# Patient Record
Sex: FEMALE | Race: WHITE | NOT HISPANIC OR LATINO | Employment: OTHER | URBAN - METROPOLITAN AREA
[De-identification: names, ages, dates, MRNs, and addresses within clinical notes are randomized per-mention and may not be internally consistent; named-entity substitution may affect disease eponyms.]

---

## 2022-04-07 ENCOUNTER — HISTORICAL (OUTPATIENT)
Dept: ADMINISTRATIVE | Facility: HOSPITAL | Age: 82
End: 2022-04-07

## 2022-04-24 VITALS
SYSTOLIC BLOOD PRESSURE: 151 MMHG | HEIGHT: 64 IN | BODY MASS INDEX: 20.32 KG/M2 | WEIGHT: 119 LBS | DIASTOLIC BLOOD PRESSURE: 84 MMHG

## 2023-03-03 ENCOUNTER — OFFICE VISIT (OUTPATIENT)
Dept: FAMILY MEDICINE | Facility: CLINIC | Age: 83
End: 2023-03-03
Payer: MEDICARE

## 2023-03-03 VITALS
SYSTOLIC BLOOD PRESSURE: 134 MMHG | DIASTOLIC BLOOD PRESSURE: 79 MMHG | WEIGHT: 122 LBS | HEART RATE: 61 BPM | HEIGHT: 64 IN | BODY MASS INDEX: 20.83 KG/M2 | TEMPERATURE: 97 F | RESPIRATION RATE: 20 BRPM

## 2023-03-03 DIAGNOSIS — K14.8 TONGUE LESION: ICD-10-CM

## 2023-03-03 DIAGNOSIS — J02.9 SORE THROAT: ICD-10-CM

## 2023-03-03 PROCEDURE — 99202 PR OFFICE/OUTPT VISIT, NEW, LEVL II, 15-29 MIN: ICD-10-PCS | Mod: ,,, | Performed by: NURSE PRACTITIONER

## 2023-03-03 PROCEDURE — 99202 OFFICE O/P NEW SF 15 MIN: CPT | Mod: ,,, | Performed by: NURSE PRACTITIONER

## 2023-03-03 NOTE — PROGRESS NOTES
"Subjective:       Patient ID: Karo Lakhani is a 82 y.o. female.    Chief Complaint: Sore Throat (Sore throat, bumps on tongue X's 4 days)     The patient is a 82-year-old female who states she does not go to health care providers because she is never sick.  She presents today stating she has bumps on her tongue that she is concerned about.  She just noticed these bumps about 4 days ago.  Patient also states she has a sore throat.  The patient tells me she is seeing a dentist next week due to the bumps on her tongue.    Sore Throat   This is a new problem. The current episode started in the past 7 days. The problem has been unchanged. Neither side of throat is experiencing more pain than the other. There has been no fever. The fever has been present for 3 to 4 days. The pain is at a severity of 5/10. The pain is moderate. She has tried gargles (warm salt water) for the symptoms. The treatment provided mild relief.     Review of Systems   HENT:  Positive for sore throat.   12 point review of systems conducted, negative except as stated in the history of present illness. See HPI for details.        Objective:        Visit Vitals  /79   Pulse 61   Temp 97.2 °F (36.2 °C) (Temporal)   Resp 20   Ht 5' 3.74" (1.619 m)   Wt 55.3 kg (122 lb)   BMI 21.11 kg/m²        Physical Exam  Vitals and nursing note reviewed.   HENT:      Head: Normocephalic.      Nose: Nose normal.      Mouth/Throat:      Mouth: Mucous membranes are moist.      Comments: Symmetrical bumps on each side of her tongue.      Throat appears raw  Eyes:      Extraocular Movements: Extraocular movements intact.   Cardiovascular:      Rate and Rhythm: Normal rate and regular rhythm.      Heart sounds: No murmur heard.  Pulmonary:      Effort: Pulmonary effort is normal.      Breath sounds: Normal breath sounds.   Abdominal:      Palpations: Abdomen is soft.   Musculoskeletal:         General: Normal range of motion.      Cervical back: Normal range of " motion and neck supple.   Skin:     General: Skin is warm and dry.   Neurological:      Mental Status: She is alert and oriented to person, place, and time.         Assessment:           ICD-10-CM ICD-9-CM   1. Tongue lesion  K14.8 529.8   2. Sore throat  J02.9 462            Plan:         1. Tongue lesion   The patient is seeing a dentist next week.    Keep the dental appointment    2. Sore throat   Gargle with magic mouthwash and spit ( Benadryl, Maalox, and lidocaine viscous )   Call if patient develops fever     Call the office with any questions or concerns          No follow-ups on file.     No future appointments.     History reviewed. No pertinent past medical history.     Review of patient's allergies indicates:  No Known Allergies     No current outpatient medications       Patient Active Problem List   Diagnosis    Tongue lesion    Sore throat           History reviewed. No pertinent past medical history.       History reviewed. No pertinent surgical history.        reports that she has never smoked. She has never used smokeless tobacco.      There is no immunization history on file for this patient.     Health Maintenance   Topic Date Due    Lipid Panel  Never done    TETANUS VACCINE  Never done    DEXA Scan  Never done

## 2023-10-16 ENCOUNTER — OFFICE VISIT (OUTPATIENT)
Dept: FAMILY MEDICINE | Facility: CLINIC | Age: 83
End: 2023-10-16
Payer: MEDICARE

## 2023-10-16 VITALS
WEIGHT: 117 LBS | HEART RATE: 78 BPM | DIASTOLIC BLOOD PRESSURE: 61 MMHG | RESPIRATION RATE: 20 BRPM | TEMPERATURE: 97 F | HEIGHT: 64 IN | BODY MASS INDEX: 19.97 KG/M2 | SYSTOLIC BLOOD PRESSURE: 97 MMHG

## 2023-10-16 DIAGNOSIS — N63.22 MASS OF UPPER INNER QUADRANT OF LEFT BREAST: ICD-10-CM

## 2023-10-16 PROCEDURE — 99213 OFFICE O/P EST LOW 20 MIN: CPT | Mod: ,,, | Performed by: NURSE PRACTITIONER

## 2023-10-16 PROCEDURE — 99213 PR OFFICE/OUTPT VISIT, EST, LEVL III, 20-29 MIN: ICD-10-PCS | Mod: ,,, | Performed by: NURSE PRACTITIONER

## 2023-10-16 NOTE — PROGRESS NOTES
"Subjective:       Patient ID: Karo Lakhani is a 82 y.o. female.    Chief Complaint: Breast Mass (Lump in left breast X's 3 days)      The patient is an 82-year-old female who states she found a lump in her breast over the weekend.  She would like to have a mammogram      Review of Systems   Integumentary:  Positive for breast mass. Negative for color change, breast discharge and breast tenderness.   Breast: Positive for mass.Negative for tenderness  12 point review of systems conducted, negative except as stated in the history of present illness. See HPI for details.      Objective:      Visit Vitals  BP 97/61   Pulse 78   Temp 97.2 °F (36.2 °C) (Temporal)   Resp 20   Ht 5' 3.74" (1.619 m)   Wt 53.1 kg (117 lb)   BMI 20.25 kg/m²     Physical Exam  Vitals and nursing note reviewed.   HENT:      Head: Normocephalic.      Right Ear: Tympanic membrane normal.      Left Ear: Tympanic membrane normal.      Nose: Nose normal.      Mouth/Throat:      Mouth: Mucous membranes are moist.   Eyes:      Extraocular Movements: Extraocular movements intact.   Cardiovascular:      Rate and Rhythm: Normal rate and regular rhythm.      Heart sounds: No murmur heard.  Pulmonary:      Effort: Pulmonary effort is normal.      Breath sounds: Normal breath sounds.   Abdominal:      General: Bowel sounds are normal.      Palpations: Abdomen is soft.   Musculoskeletal:         General: Normal range of motion.      Cervical back: Normal range of motion and neck supple.   Skin:     General: Skin is warm and dry.      Comments: Left breast 12:00 p.m. near the nipple is a hard mass that is nontender to palpation   Neurological:      Mental Status: She is alert and oriented to person, place, and time.       No current outpatient medications  has No Known Allergies.       Assessment:         ICD-10-CM ICD-9-CM   1. Mass of upper inner quadrant of left breast  N63.22 611.72          Plan:   1. Mass of upper inner quadrant of left breast  - Mammo " Digital Diagnostic Left with Shaheen; Future  - US Breast Left Limited; Future        Follow up in about 2 weeks (around 10/30/2023), or if symptoms worsen or fail to improve.     No future appointments.

## 2023-11-15 ENCOUNTER — HOSPITAL ENCOUNTER (OUTPATIENT)
Dept: RADIOLOGY | Facility: HOSPITAL | Age: 83
Discharge: HOME | End: 2023-11-15
Attending: NURSE PRACTITIONER
Payer: MEDICARE

## 2023-11-15 DIAGNOSIS — N63.22 MASS OF UPPER INNER QUADRANT OF LEFT BREAST: ICD-10-CM

## 2023-11-15 PROCEDURE — 77062 BREAST TOMOSYNTHESIS BI: CPT | Mod: 26,,, | Performed by: STUDENT IN AN ORGANIZED HEALTH CARE EDUCATION/TRAINING PROGRAM

## 2023-11-15 PROCEDURE — 77062 MAMMO DIGITAL DIAGNOSTIC BILAT WITH TOMO: ICD-10-PCS | Mod: 26,,, | Performed by: STUDENT IN AN ORGANIZED HEALTH CARE EDUCATION/TRAINING PROGRAM

## 2023-11-15 PROCEDURE — 77066 DX MAMMO INCL CAD BI: CPT | Mod: TC

## 2023-11-15 PROCEDURE — 76642 ULTRASOUND BREAST LIMITED: CPT | Mod: TC,LT

## 2023-11-15 PROCEDURE — 77066 DX MAMMO INCL CAD BI: CPT | Mod: 26,,, | Performed by: STUDENT IN AN ORGANIZED HEALTH CARE EDUCATION/TRAINING PROGRAM

## 2023-11-15 PROCEDURE — 76642 ULTRASOUND BREAST LIMITED: CPT | Mod: 26,LT,, | Performed by: STUDENT IN AN ORGANIZED HEALTH CARE EDUCATION/TRAINING PROGRAM

## 2023-11-15 PROCEDURE — 77066 MAMMO DIGITAL DIAGNOSTIC BILAT WITH TOMO: ICD-10-PCS | Mod: 26,,, | Performed by: STUDENT IN AN ORGANIZED HEALTH CARE EDUCATION/TRAINING PROGRAM

## 2023-11-15 PROCEDURE — 76642 US BREAST LEFT LIMITED: ICD-10-PCS | Mod: 26,LT,, | Performed by: STUDENT IN AN ORGANIZED HEALTH CARE EDUCATION/TRAINING PROGRAM

## 2023-12-06 ENCOUNTER — HOSPITAL ENCOUNTER (OUTPATIENT)
Dept: RADIOLOGY | Facility: HOSPITAL | Age: 83
Discharge: HOME | End: 2023-12-06
Attending: NURSE PRACTITIONER
Payer: MEDICARE

## 2023-12-06 DIAGNOSIS — R92.8 ABNORMAL MAMMOGRAM: ICD-10-CM

## 2023-12-06 DIAGNOSIS — R92.8 ABNORMAL MAMMOGRAM: Primary | ICD-10-CM

## 2023-12-06 PROCEDURE — 77061 MAMMO DIGITAL DIAGNOSTIC LEFT WITH TOMO: ICD-10-PCS | Mod: 26,LT,, | Performed by: STUDENT IN AN ORGANIZED HEALTH CARE EDUCATION/TRAINING PROGRAM

## 2023-12-06 PROCEDURE — 19083 BX BREAST 1ST LESION US IMAG: CPT | Mod: LT,,, | Performed by: STUDENT IN AN ORGANIZED HEALTH CARE EDUCATION/TRAINING PROGRAM

## 2023-12-06 PROCEDURE — 19083 US BREAST BIOPSY WITH IMAGING 1ST SITE LEFT: ICD-10-PCS | Mod: LT,,, | Performed by: STUDENT IN AN ORGANIZED HEALTH CARE EDUCATION/TRAINING PROGRAM

## 2023-12-06 PROCEDURE — 88305 TISSUE EXAM BY PATHOLOGIST: CPT | Performed by: NURSE PRACTITIONER

## 2023-12-06 PROCEDURE — 19083 BX BREAST 1ST LESION US IMAG: CPT | Mod: LT

## 2023-12-06 PROCEDURE — 77065 DX MAMMO INCL CAD UNI: CPT | Mod: 26,LT,, | Performed by: STUDENT IN AN ORGANIZED HEALTH CARE EDUCATION/TRAINING PROGRAM

## 2023-12-06 PROCEDURE — 77065 DX MAMMO INCL CAD UNI: CPT | Mod: TC,LT

## 2023-12-06 PROCEDURE — 88361 TUMOR IMMUNOHISTOCHEM/COMPUT: CPT

## 2023-12-06 PROCEDURE — 77061 BREAST TOMOSYNTHESIS UNI: CPT | Mod: 26,LT,, | Performed by: STUDENT IN AN ORGANIZED HEALTH CARE EDUCATION/TRAINING PROGRAM

## 2023-12-06 PROCEDURE — 77065 MAMMO DIGITAL DIAGNOSTIC LEFT WITH TOMO: ICD-10-PCS | Mod: 26,LT,, | Performed by: STUDENT IN AN ORGANIZED HEALTH CARE EDUCATION/TRAINING PROGRAM

## 2023-12-06 RX ORDER — BUPIVACAINE HYDROCHLORIDE 2.5 MG/ML
10 INJECTION, SOLUTION EPIDURAL; INFILTRATION; INTRACAUDAL ONCE
Status: DISCONTINUED | OUTPATIENT
Start: 2023-12-06 | End: 2023-12-07 | Stop reason: HOSPADM

## 2023-12-11 DIAGNOSIS — C50.912 INVASIVE DUCTAL CARCINOMA OF BREAST, FEMALE, LEFT: Primary | ICD-10-CM

## 2023-12-11 NOTE — PROGRESS NOTES
Biopsy pathology result called to patient per Dr. Barajas 12/8/23; invasive ductal carcinoma. Surgical consultation recommended. Referral sent to Dr. Escobar per patient request. Clinic will contact patient to schedule appointment.

## 2023-12-15 NOTE — PROGRESS NOTES
[facility] - [facility] [place] [facility]  Breast Surgical Oncology  New Patient Office Visit - H&P      Referring Provider: Arline Greenwood   PCP: Arline Greenwood NP     Chief Complaint:   Chief Complaint   Patient presents with    Breast Cancer     Patient reports no breast related concerns         Subjective:     HPI:  Karo Lakhani is a 83 y.o. female who presents on 2023 for evaluation of newly diagnosed left  breast cancer.    A detailed patient history was obtained and reviewed. She currently denies any breast issues including rashes, redness, pain, swelling, nipple discharge, or new lumps/masses.    MG breast density: Category B (scattered areas of fibroglandular tissue)     Imagin2023 BL DG MG/ L US BREAST LIMITED at [facility]- which revealed on L MG in the area of palpable concern at the 12 o'clock anterior to middle depth, there is a spiculated mass with associated heterogeneous calcifications which spans on the order of 5 cm mammographically and extends towards the nipple which appears mildly retracted. On R MG, there are no suspicious findings. On L US at 12 o'clock 4 cm FN,  there is an irregular hypoechoic mass with associated calcifications, spanning approximately 4.7 x 1.7 x 1.8 cm.  This demonstrates linear hypoechoic component extending toward the nipple as well as a presumed satellite nodule measuring 1.0 x 0.7 by 0.8 cm at the 11 o'clock  2 cm FN.  No pathologically enlarged lymph nodes visualized in the left axilla.  BIRADS-5 highly suspicious; biopsy recommended.        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 Patient is postmenopausal.  Hysterectomy/Oophorectomy: menopause, at age 56  Hormonal birth control (duration): No none.   Pregnancy History:   Age at first live birth: 1  Hormone Replacement Therapy: No, none  Patient did not breast feed.  Patient denies nipple discharge.   Patient denies to previous breast biopsy.   Patient denies to a personal history of breast cancer.    Other Relevant History:  Prior thoracic RT: none  Genetic testing: No  Ashkenazi Mormonism descent: No    Family History:  Family History   Problem Relation Age of Onset    Dementia Mother 80 - 89    Heart disease Mother     Colon cancer Father 91    Throat cancer Maternal Grandmother     Skin cancer Maternal Grandmother         Past History:  History reviewed. No pertinent past medical history.     Past Surgical History:   Procedure Laterality Date    HIP FRACTURE SURGERY Left 10/2017        Social History     Socioeconomic History    Marital status: Single   Tobacco Use    Smoking status: Never     Passive exposure: Never    Smokeless tobacco: Never   Substance and Sexual Activity    Alcohol use: Never    Drug use: Never        Body mass index is 20.9 kg/m².     Allergy/Medications:   Review of patient's allergies indicates:  No Known Allergies     No current outpatient medications on file.       Review of Systems:  Review of Systems   Constitutional:  Negative for chills, fever and weight loss.   HENT:  Negative for sore throat.    Eyes:  Negative for blurred vision and double vision.   Respiratory:  Negative for cough and shortness of breath.    Cardiovascular:  Positive for chest pain. Negative for palpitations and leg swelling.        Chest pain-occasionally feels tightness that lasts for a few minutes(at rest)   Gastrointestinal:  Positive for constipation. Negative for abdominal pain, diarrhea, heartburn, nausea and vomiting.   Genitourinary:  Negative for dysuria, flank pain, frequency, hematuria and urgency.  "  Musculoskeletal:  Negative for back pain, joint pain and myalgias.   Neurological:  Negative for dizziness and headaches.   Endo/Heme/Allergies:  Does not bruise/bleed easily.   Psychiatric/Behavioral:  Negative for depression. The patient is not nervous/anxious.           Objective:     Vitals:  Blood pressure 126/75, pulse 60, temperature 98 °F (36.7 °C), temperature source Oral, resp. rate 18, height 5' 3" (1.6 m), weight 53.5 kg (118 lb), SpO2 100 %.      Physical Exam:  General: The patient is awake, alert and oriented times three. The patient is well nourished and in no acute distress.   Neck: There is no evidence of palpable cervical, supraclavicular or axillary adenopathy. The neck is supple. The thyroid is not enlarged.   Musculoskeletal: The patient has a normal range of motion of her bilateral upper extremities.   Chest: Examination of the chest wall fails to reveal any obvious abnormalities. The lungs are clear to auscultation bilaterally without rales, rhonchi, or wheezing.   Cardiovascular: The heart has a regular rate and rhythm without murmurs, gallops or rubs.  Breast:  Right: Examination of right breast fails to reveal any dominant masses or areas of significant focal nodularity. The nipple is everted without evidence of discharge. There is no skin dimpling with movement of the pectoralis. There are no significant skin changes overlying the breast.   Left: Examination of the left breast revealed a dominant palpable mass in there upper left breast at about 12 o'clock with slight nipple retraction consistent with the area of biopsy-proven malignancy. The nipple is without evidence of discharge. There is no skin dimpling with movement of the pectoralis. There are no significant skin changes overlying the breast.  Abdomen: The abdomen is soft, flat, nontender and nondistended with no palpable masses or organomegaly.  Integumentary: no rashes or skin lesions present  Neurologic: cranial nerves intact, " no signs of peripheral neurological deficit, motor/sensory function intact      Lab Review:  CBC:   Recent Labs     12/19/23  1207   WBC 6.84   RBC 4.78   HGB 14.5   HCT 45.2        CMP:   Recent Labs     12/19/23  1207      K 3.8   CO2 29   BUN 11.9   CREATININE 0.81   CALCIUM 9.7   LABPROT 6.2   ALBUMIN 4.1   BILITOT 0.6   ALKPHOS 62   AST 21   ALT 13         Assessment and Discussion:      Cancer Staging   Malignant neoplasm of overlapping sites of left breast in female, estrogen receptor positive  Staging form: Breast, AJCC 8th Edition  - Clinical stage from 12/19/2023: Stage IB (cT2, cN0, cM0, G2, ER+, NY+, HER2-) - Signed by Dayami Escobar MD on 12/19/2023        Encounter Diagnoses   Name Primary?    Malignant neoplasm of overlapping sites of left breast in female, estrogen receptor positive Yes    Preop testing       We discussed her imaging and pathology results in detail     We discussed the need to proceed with local and systemic treatment.      Local Surgical Treatment options:     Breast Surgical Procedures  Breast Conservation Surgery (Partial Mastectomy or Lumpectomy)  Palpation-Guided - removal of breast cancer which is felt on clinical exam  Localization-Guided - required placement of a MagSeed and/or wire (if needed) in the area of concern to allow for identification during surgery. This will take place prior to surgery (usually a few days before). During surgery the MagSeed is detected with a probe and the cancer area is removed along with the MagSeed and/or previously placed clip.   Incisions are usually closed with dissolving stitches, followed by glue and Dermabond.   Mastectomy  Simple - includes removal of breast skin, subcutaneous (fat) tissue, and nipple areolar complex.   Skin-Sparing - includes removal of the breast skin while sparing enough for reconstruction. It also is involves removal of the subcutaneous (fat) tissue and nipple areolar complex.  Nipple Sparing -  "includes removal of all the breast tissue underneath the nipple, areola, and breast skin is removed. The tissue beneath the nipple and areola are checked for cancer. If cancer is detected, the nipple and areola are then removed.   Surgical Risk include surgical site infections, hematoma or seroma requiring operation, necrosis of nipple-areola and/or mastectomy flaps requiring debridement or hyperbaric therapy, unplanned re-operations, and delay in adjuvant treatments.    Drain placement may be necessary after mastectomy and can remain in place for greater than 10-14 day, occasionally longer.     Axillary Surgical Procedures  [place] Lymph Node Biopsy  Technetium-99 - a clear substance injected around the nipple and areola on the day of surgery prior to surgery starting which provides a sound "road" map to the lymph nodes needing to be removed for further examination.  Blue Dye - either Methylene Blue or Isosulfan is given in the operating room and provides a color "road" map to the lymph nodes needing to be removed for further examination.  MagTrace (Superparamagnetic oxide) - is used to assist with lymph node mapping as well.  Risk - axillary swelling related to bleeding or serous fluid, infection, nerve damage (temporary or permanent), lymphedema (5-6% risk), additional surgery related to final pathology or complications from the procedure  Axillary Lymph Node Dissection  Risk including  the above plus nerve injury which could be permanent and lymphedema risk above 20-25%     Reconstruction Procedures  Implant- based  Autologous-based  Combination  Immediate versus Delayed  Oncoplastic reduction     The pros and cons of these reconstructive methods were addressed. A second operation maybe required before the final completion of the reconstructive process. I also explained to the patient that the reconstructive options are generally by law required to be covered by insurance and would be considered part of a " cancer operation and not a cosmetic operation.     Radiation Treatment Options:  Whole-Breast  Partial Breast      Systemic treatment options:  Hormone Blocker/Endocrine Therapy  Tamoxifen  Raloxifene  Aromatase Inhibitor   Letrozole (Femara)   Anastrozole (Arimidex)   Exemestane (Aromasin)     2. Chemotherapy     3. Immunomodulator & Target Therapies (such as Herceptin)    Based on the size of her breast, the lesion size, and the associated satellite lesion, we elected to proceed with a mastectomy.         Plan:       Problem List Items Addressed This Visit          Oncology    Malignant neoplasm of overlapping sites of left breast in female, estrogen receptor positive - Primary    Current Assessment & Plan     Surgery - Left simple mastectomy with left SLNB, possible ALND  Tentative Date: 1/12/2024  Preop - CBC, CMP, EKG  Surgical instructions and information were discussed with her in detail         Relevant Orders    Ambulatory referral/consult to Home Health    Case Request Operating Room: MASTECTOMY, SIMPLE - LEFT, BIOPSY, LYMPH NODE, SENTINEL - LEFT, LYMPHADENECTOMY, AXILLARY - LEFT (Completed)     Other Visit Diagnoses       Preop testing        Relevant Orders    CBC Auto Differential (Completed)    Comprehensive Metabolic Panel (Completed)    EKG 12-lead                All of questions were answered    Dayami Escobar MD  Breast Surgical Oncology     OFFICE VISIT CODING:    Non-face-to-face time included:  Yes Preparing to see the patient such as reviewing the patient record  Yes Obtaining and reviewing separately obtained history  Yes Independently interpreting results  Yes Documenting clinical information in electronic health record  No Ordering appropriate medications  Yes Ordering appropriate tests  Yes Ordering appropriate procedures (including follow-up)  Yes Referring and communicating with other health care professionals (not separately reported)  Yes Care Coordination (not separately  reported)    Face-to-face time included:  Yes Performing a medically necessary appropriate history, examination, and/or evaluation  Yes Communicating results to the patient/family/caregiver  Yes Counseling and educating the patient/family/caregiver  Yes Answering patient/family/caregiver questions    Total Time: 65 minutes    Total time includes both face-to-face and non-face-to-face time personally spent by myself on the day of the visit.

## 2023-12-15 NOTE — H&P (VIEW-ONLY)
[facility] - [facility] [place] [facility]  Breast Surgical Oncology  New Patient Office Visit - H&P      Referring Provider: Arline Greenwood   PCP: Arline Greenwood NP     Chief Complaint:   Chief Complaint   Patient presents with    Breast Cancer     Patient reports no breast related concerns         Subjective:     HPI:  Karo Lakhani is a 83 y.o. female who presents on 2023 for evaluation of newly diagnosed left  breast cancer.    A detailed patient history was obtained and reviewed. She currently denies any breast issues including rashes, redness, pain, swelling, nipple discharge, or new lumps/masses.    MG breast density: Category B (scattered areas of fibroglandular tissue)     Imagin2023 BL DG MG/ L US BREAST LIMITED at [facility]- which revealed on L MG in the area of palpable concern at the 12 o'clock anterior to middle depth, there is a spiculated mass with associated heterogeneous calcifications which spans on the order of 5 cm mammographically and extends towards the nipple which appears mildly retracted. On R MG, there are no suspicious findings. On L US at 12 o'clock 4 cm FN,  there is an irregular hypoechoic mass with associated calcifications, spanning approximately 4.7 x 1.7 x 1.8 cm.  This demonstrates linear hypoechoic component extending toward the nipple as well as a presumed satellite nodule measuring 1.0 x 0.7 by 0.8 cm at the 11 o'clock  2 cm FN.  No pathologically enlarged lymph nodes visualized in the left axilla.  BIRADS-5 highly suspicious; biopsy recommended.        Pathology:  2023 Ultrasound-guided Core Needle Biopsy Left Breast 12 o'clock 4 cm FN-         - Invasive ductal carcinoma, grade 2 (measuring 1.5 cm)         - Focal ductal carcinoma in situ, grade 2 (measuring 1 mm)           ER 95%           NY 94%           HER 2 mj 2+/ FISH Negative           Ki67 27%    OB/GYN History:  Age at Menarche Onset: 14  Menopausal Status: postmenopausal, LMP: No LMP  recorded. Patient is postmenopausal.  Hysterectomy/Oophorectomy: menopause, at age 56  Hormonal birth control (duration): No none.   Pregnancy History:   Age at first live birth: 1  Hormone Replacement Therapy: No, none  Patient did not breast feed.  Patient denies nipple discharge.   Patient denies to previous breast biopsy.   Patient denies to a personal history of breast cancer.    Other Relevant History:  Prior thoracic RT: none  Genetic testing: No  Ashkenazi Adventism descent: No    Family History:  Family History   Problem Relation Age of Onset    Dementia Mother 80 - 89    Heart disease Mother     Colon cancer Father 91    Throat cancer Maternal Grandmother     Skin cancer Maternal Grandmother         Past History:  History reviewed. No pertinent past medical history.     Past Surgical History:   Procedure Laterality Date    HIP FRACTURE SURGERY Left 10/2017        Social History     Socioeconomic History    Marital status: Single   Tobacco Use    Smoking status: Never     Passive exposure: Never    Smokeless tobacco: Never   Substance and Sexual Activity    Alcohol use: Never    Drug use: Never        Body mass index is 20.9 kg/m².     Allergy/Medications:   Review of patient's allergies indicates:  No Known Allergies     No current outpatient medications on file.       Review of Systems:  Review of Systems   Constitutional:  Negative for chills, fever and weight loss.   HENT:  Negative for sore throat.    Eyes:  Negative for blurred vision and double vision.   Respiratory:  Negative for cough and shortness of breath.    Cardiovascular:  Positive for chest pain. Negative for palpitations and leg swelling.        Chest pain-occasionally feels tightness that lasts for a few minutes(at rest)   Gastrointestinal:  Positive for constipation. Negative for abdominal pain, diarrhea, heartburn, nausea and vomiting.   Genitourinary:  Negative for dysuria, flank pain, frequency, hematuria and urgency.  "  Musculoskeletal:  Negative for back pain, joint pain and myalgias.   Neurological:  Negative for dizziness and headaches.   Endo/Heme/Allergies:  Does not bruise/bleed easily.   Psychiatric/Behavioral:  Negative for depression. The patient is not nervous/anxious.           Objective:     Vitals:  Blood pressure 126/75, pulse 60, temperature 98 °F (36.7 °C), temperature source Oral, resp. rate 18, height 5' 3" (1.6 m), weight 53.5 kg (118 lb), SpO2 100 %.      Physical Exam:  General: The patient is awake, alert and oriented times three. The patient is well nourished and in no acute distress.   Neck: There is no evidence of palpable cervical, supraclavicular or axillary adenopathy. The neck is supple. The thyroid is not enlarged.   Musculoskeletal: The patient has a normal range of motion of her bilateral upper extremities.   Chest: Examination of the chest wall fails to reveal any obvious abnormalities. The lungs are clear to auscultation bilaterally without rales, rhonchi, or wheezing.   Cardiovascular: The heart has a regular rate and rhythm without murmurs, gallops or rubs.  Breast:  Right: Examination of right breast fails to reveal any dominant masses or areas of significant focal nodularity. The nipple is everted without evidence of discharge. There is no skin dimpling with movement of the pectoralis. There are no significant skin changes overlying the breast.   Left: Examination of the left breast revealed a dominant palpable mass in there upper left breast at about 12 o'clock with slight nipple retraction consistent with the area of biopsy-proven malignancy. The nipple is without evidence of discharge. There is no skin dimpling with movement of the pectoralis. There are no significant skin changes overlying the breast.  Abdomen: The abdomen is soft, flat, nontender and nondistended with no palpable masses or organomegaly.  Integumentary: no rashes or skin lesions present  Neurologic: cranial nerves intact, " no signs of peripheral neurological deficit, motor/sensory function intact      Lab Review:  CBC:   Recent Labs     12/19/23  1207   WBC 6.84   RBC 4.78   HGB 14.5   HCT 45.2        CMP:   Recent Labs     12/19/23  1207      K 3.8   CO2 29   BUN 11.9   CREATININE 0.81   CALCIUM 9.7   LABPROT 6.2   ALBUMIN 4.1   BILITOT 0.6   ALKPHOS 62   AST 21   ALT 13         Assessment and Discussion:      Cancer Staging   Malignant neoplasm of overlapping sites of left breast in female, estrogen receptor positive  Staging form: Breast, AJCC 8th Edition  - Clinical stage from 12/19/2023: Stage IB (cT2, cN0, cM0, G2, ER+, NY+, HER2-) - Signed by Dayami Escobar MD on 12/19/2023        Encounter Diagnoses   Name Primary?    Malignant neoplasm of overlapping sites of left breast in female, estrogen receptor positive Yes    Preop testing       We discussed her imaging and pathology results in detail     We discussed the need to proceed with local and systemic treatment.      Local Surgical Treatment options:     Breast Surgical Procedures  Breast Conservation Surgery (Partial Mastectomy or Lumpectomy)  Palpation-Guided - removal of breast cancer which is felt on clinical exam  Localization-Guided - required placement of a MagSeed and/or wire (if needed) in the area of concern to allow for identification during surgery. This will take place prior to surgery (usually a few days before). During surgery the MagSeed is detected with a probe and the cancer area is removed along with the MagSeed and/or previously placed clip.   Incisions are usually closed with dissolving stitches, followed by glue and Dermabond.   Mastectomy  Simple - includes removal of breast skin, subcutaneous (fat) tissue, and nipple areolar complex.   Skin-Sparing - includes removal of the breast skin while sparing enough for reconstruction. It also is involves removal of the subcutaneous (fat) tissue and nipple areolar complex.  Nipple Sparing -  "includes removal of all the breast tissue underneath the nipple, areola, and breast skin is removed. The tissue beneath the nipple and areola are checked for cancer. If cancer is detected, the nipple and areola are then removed.   Surgical Risk include surgical site infections, hematoma or seroma requiring operation, necrosis of nipple-areola and/or mastectomy flaps requiring debridement or hyperbaric therapy, unplanned re-operations, and delay in adjuvant treatments.    Drain placement may be necessary after mastectomy and can remain in place for greater than 10-14 day, occasionally longer.     Axillary Surgical Procedures  [place] Lymph Node Biopsy  Technetium-99 - a clear substance injected around the nipple and areola on the day of surgery prior to surgery starting which provides a sound "road" map to the lymph nodes needing to be removed for further examination.  Blue Dye - either Methylene Blue or Isosulfan is given in the operating room and provides a color "road" map to the lymph nodes needing to be removed for further examination.  MagTrace (Superparamagnetic oxide) - is used to assist with lymph node mapping as well.  Risk - axillary swelling related to bleeding or serous fluid, infection, nerve damage (temporary or permanent), lymphedema (5-6% risk), additional surgery related to final pathology or complications from the procedure  Axillary Lymph Node Dissection  Risk including  the above plus nerve injury which could be permanent and lymphedema risk above 20-25%     Reconstruction Procedures  Implant- based  Autologous-based  Combination  Immediate versus Delayed  Oncoplastic reduction     The pros and cons of these reconstructive methods were addressed. A second operation maybe required before the final completion of the reconstructive process. I also explained to the patient that the reconstructive options are generally by law required to be covered by insurance and would be considered part of a " cancer operation and not a cosmetic operation.     Radiation Treatment Options:  Whole-Breast  Partial Breast      Systemic treatment options:  Hormone Blocker/Endocrine Therapy  Tamoxifen  Raloxifene  Aromatase Inhibitor   Letrozole (Femara)   Anastrozole (Arimidex)   Exemestane (Aromasin)     2. Chemotherapy     3. Immunomodulator & Target Therapies (such as Herceptin)    Based on the size of her breast, the lesion size, and the associated satellite lesion, we elected to proceed with a mastectomy.         Plan:       Problem List Items Addressed This Visit          Oncology    Malignant neoplasm of overlapping sites of left breast in female, estrogen receptor positive - Primary    Current Assessment & Plan     Surgery - Left simple mastectomy with left SLNB, possible ALND  Tentative Date: 1/12/2024  Preop - CBC, CMP, EKG  Surgical instructions and information were discussed with her in detail         Relevant Orders    Ambulatory referral/consult to Home Health    Case Request Operating Room: MASTECTOMY, SIMPLE - LEFT, BIOPSY, LYMPH NODE, SENTINEL - LEFT, LYMPHADENECTOMY, AXILLARY - LEFT (Completed)     Other Visit Diagnoses       Preop testing        Relevant Orders    CBC Auto Differential (Completed)    Comprehensive Metabolic Panel (Completed)    EKG 12-lead                All of questions were answered    Dayami Escobar MD  Breast Surgical Oncology     OFFICE VISIT CODING:    Non-face-to-face time included:  Yes Preparing to see the patient such as reviewing the patient record  Yes Obtaining and reviewing separately obtained history  Yes Independently interpreting results  Yes Documenting clinical information in electronic health record  No Ordering appropriate medications  Yes Ordering appropriate tests  Yes Ordering appropriate procedures (including follow-up)  Yes Referring and communicating with other health care professionals (not separately reported)  Yes Care Coordination (not separately  reported)    Face-to-face time included:  Yes Performing a medically necessary appropriate history, examination, and/or evaluation  Yes Communicating results to the patient/family/caregiver  Yes Counseling and educating the patient/family/caregiver  Yes Answering patient/family/caregiver questions    Total Time: 65 minutes    Total time includes both face-to-face and non-face-to-face time personally spent by myself on the day of the visit.

## 2023-12-18 ENCOUNTER — TELEPHONE (OUTPATIENT)
Dept: FAMILY MEDICINE | Facility: CLINIC | Age: 83
End: 2023-12-18
Payer: MEDICARE

## 2023-12-18 LAB — PSYCHE PATHOLOGY RESULT: NORMAL

## 2023-12-18 NOTE — TELEPHONE ENCOUNTER
I called the patient and spoke with her the phone.  She had a breast biopsy that shows malignancy.  We wanted to let her know we support her and she needs anything to call the office.

## 2023-12-19 ENCOUNTER — OFFICE VISIT (OUTPATIENT)
Dept: SURGERY | Facility: CLINIC | Age: 83
End: 2023-12-19
Payer: MEDICARE

## 2023-12-19 VITALS
BODY MASS INDEX: 20.91 KG/M2 | TEMPERATURE: 98 F | WEIGHT: 118 LBS | SYSTOLIC BLOOD PRESSURE: 126 MMHG | HEIGHT: 63 IN | OXYGEN SATURATION: 100 % | HEART RATE: 60 BPM | DIASTOLIC BLOOD PRESSURE: 75 MMHG | RESPIRATION RATE: 18 BRPM

## 2023-12-19 DIAGNOSIS — Z01.818 PREOP TESTING: ICD-10-CM

## 2023-12-19 DIAGNOSIS — C50.812 MALIGNANT NEOPLASM OF OVERLAPPING SITES OF LEFT BREAST IN FEMALE, ESTROGEN RECEPTOR POSITIVE: Primary | ICD-10-CM

## 2023-12-19 DIAGNOSIS — Z17.0 MALIGNANT NEOPLASM OF OVERLAPPING SITES OF LEFT BREAST IN FEMALE, ESTROGEN RECEPTOR POSITIVE: Primary | ICD-10-CM

## 2023-12-19 PROCEDURE — 99215 OFFICE O/P EST HI 40 MIN: CPT | Mod: PBBFAC | Performed by: SURGERY

## 2023-12-19 PROCEDURE — 99999 PR PBB SHADOW E&M-EST. PATIENT-LVL V: CPT | Mod: PBBFAC,,, | Performed by: SURGERY

## 2023-12-19 PROCEDURE — 99999 PR PBB SHADOW E&M-EST. PATIENT-LVL V: ICD-10-PCS | Mod: PBBFAC,,, | Performed by: SURGERY

## 2023-12-19 PROCEDURE — 99205 OFFICE O/P NEW HI 60 MIN: CPT | Mod: S$PBB,,, | Performed by: SURGERY

## 2023-12-19 PROCEDURE — 99205 PR OFFICE/OUTPT VISIT, NEW, LEVL V, 60-74 MIN: ICD-10-PCS | Mod: S$PBB,,, | Performed by: SURGERY

## 2023-12-19 RX ORDER — SODIUM CHLORIDE, SODIUM LACTATE, POTASSIUM CHLORIDE, CALCIUM CHLORIDE 600; 310; 30; 20 MG/100ML; MG/100ML; MG/100ML; MG/100ML
INJECTION, SOLUTION INTRAVENOUS CONTINUOUS
Status: CANCELLED | OUTPATIENT
Start: 2023-12-19

## 2023-12-19 NOTE — NURSING
Breast Nurse Navigator Note    Distress Screening Tool  Distress Score    Distress Score: 1    Met with patient after her consult with Dr. Escobar.  Patient's sister present during the consultation.  Referred to the following outside resources: [employer] and [facility]. Verbalized understanding that these resources may provide support throughout the course of her treatment. Patient amenable to receiving additional support and gave her permission to be referred to these organizations.   Breast Cancer Education: Breast Cancer Binder and Nurse Alfa given to the patient.   All questions answers to the patient's satisfaction.

## 2023-12-19 NOTE — ASSESSMENT & PLAN NOTE
Surgery - Left simple mastectomy with left SLNB, possible ALND  Tentative Date: 1/12/2024  Preop - CBC, CMP, EKG  Surgical instructions and information were discussed with her in detail

## 2023-12-20 ENCOUNTER — TELEPHONE (OUTPATIENT)
Dept: SURGERY | Facility: CLINIC | Age: 83
End: 2023-12-20
Payer: MEDICARE

## 2023-12-20 NOTE — TELEPHONE ENCOUNTER
Spoke with patient to notify that we will be referring her to [facility] for evaluation for cardiac risk for surgery.  Patient states understanding.  Referral sent.      ----- Message from Dayami Escobar MD sent at 12/20/2023  8:18 AM CST -----  She has some slight abnormality in her EKG, we might need to get her evaluated by [facility].   Labs look fine. No concerns

## 2024-01-09 ENCOUNTER — PATIENT MESSAGE (OUTPATIENT)
Dept: ADMINISTRATIVE | Facility: OTHER | Age: 84
End: 2024-01-09
Payer: MEDICARE

## 2024-01-10 ENCOUNTER — PATIENT MESSAGE (OUTPATIENT)
Dept: ADMINISTRATIVE | Facility: OTHER | Age: 84
End: 2024-01-10
Payer: MEDICARE

## 2024-01-10 ENCOUNTER — PROCEDURE VISIT (OUTPATIENT)
Dept: SURGERY | Facility: CLINIC | Age: 84
End: 2024-01-10
Payer: MEDICARE

## 2024-01-10 VITALS
HEIGHT: 64 IN | OXYGEN SATURATION: 100 % | TEMPERATURE: 98 F | HEART RATE: 61 BPM | BODY MASS INDEX: 20.06 KG/M2 | SYSTOLIC BLOOD PRESSURE: 145 MMHG | DIASTOLIC BLOOD PRESSURE: 75 MMHG | RESPIRATION RATE: 18 BRPM | WEIGHT: 117.5 LBS

## 2024-01-10 DIAGNOSIS — C50.812 MALIGNANT NEOPLASM OF OVERLAPPING SITES OF LEFT BREAST IN FEMALE, ESTROGEN RECEPTOR POSITIVE: Primary | ICD-10-CM

## 2024-01-10 DIAGNOSIS — Z17.0 MALIGNANT NEOPLASM OF OVERLAPPING SITES OF LEFT BREAST IN FEMALE, ESTROGEN RECEPTOR POSITIVE: Primary | ICD-10-CM

## 2024-01-10 PROCEDURE — 99499 UNLISTED E&M SERVICE: CPT | Mod: S$PBB,,, | Performed by: PHYSICIAN ASSISTANT

## 2024-01-10 PROCEDURE — 38790 INJECT FOR LYMPHATIC X-RAY: CPT | Mod: PBBFAC | Performed by: PHYSICIAN ASSISTANT

## 2024-01-10 NOTE — PROCEDURES
DATE OF SERVICE: 01/10/2024    Provider: Mitali Jimenez PA-C    PREOPERATIVE DIAGNOSIS:   Encounter Diagnoses   Name Primary?    Malignant neoplasm of overlapping sites of left breast in female, estrogen receptor positive Yes       POSTOPERATIVE DIAGNOSIS:   Encounter Diagnoses   Name Primary?    Malignant neoplasm of overlapping sites of left breast in female, estrogen receptor positive Yes       PROCEDURE: Injection of non-radioactive, non-contrast/non-visualization agent - 2mL of superparamagnetic iron oxide (SPIO) nanoparticles (56 mg Iron and 64 mg Carboxydextran/2 mL)    ANESTHESIA:  5 mL of 2% Lidocaine was injected at the lateral subareolar border left breast     TECHNIQUE:    1. 2.0 mL of superparamagnetic iron oxide (SPIO) nanoparticles was injected subareolar in the left breast for [place] node localization with a SentiMag probe.       PLAN:  left [place] Lymph Node Localization performed today as above with planned SentiMag probe localization anticipated at the time of left SLNB        All of her questions were answered.     Mitali Jimenez PA-C

## 2024-01-11 ENCOUNTER — ANESTHESIA EVENT (OUTPATIENT)
Dept: SURGERY | Facility: HOSPITAL | Age: 84
End: 2024-01-11
Payer: MEDICARE

## 2024-01-11 ENCOUNTER — PATIENT MESSAGE (OUTPATIENT)
Dept: ADMINISTRATIVE | Facility: OTHER | Age: 84
End: 2024-01-11
Payer: MEDICARE

## 2024-01-11 NOTE — ANESTHESIA PREPROCEDURE EVALUATION
01/11/2024  Karo Lakhani is a 83 y.o., female with ----------------------------  Cancer breast    And ----------------------------  Hip fracture surgery    Presents today for left simple mastectomy for left sided breast cancer           Pre-op Assessment    I have reviewed the NPO Status.      Review of Systems      Physical Exam  General: Well nourished, Cooperative, Alert and Oriented    Airway:  Mallampati: II   Mouth Opening: Normal  TM Distance: Normal  Tongue: Normal  Neck ROM: Extension Decreased    Dental:  Periodontal disease  Worn appropriate for age - none loose  Chest/Lungs:  Clear to auscultation, Normal Respiratory Rate    Heart:  Rate: Normal  Rhythm: Regular Rhythm    Musculoskeletal:  Thoracic Kyphosis       Latest Reference Range & Units 12/19/23 12:07   WBC 4.50 - 11.50 x10(3)/mcL 6.84   Hemoglobin 12.0 - 16.0 g/dL 14.5   Hematocrit 37.0 - 47.0 % 45.2   Platelet Count 130 - 400 x10(3)/mcL 235   Sodium 136 - 145 mmol/L 142   Potassium 3.5 - 5.1 mmol/L 3.8   Chloride 98 - 107 mmol/L 108 (H)   CO2 23 - 31 mmol/L 29   BUN 9.8 - 20.1 mg/dL 11.9   Creatinine 0.55 - 1.02 mg/dL 0.81   eGFR mls/min/1.73/m2 >60   Glucose 82 - 115 mg/dL 97   (H): Data is abnormally high    Anesthesia Plan  Type of Anesthesia, risks & benefits discussed:    Anesthesia Type: Gen Supraglottic Airway  Intra-op Monitoring Plan: Standard ASA Monitors  Post Op Pain Control Plan: multimodal analgesia and IV/PO Opioids PRN  Induction:  IV  Airway Plan: Direct, Post-Induction  Informed Consent: Informed consent signed with the Patient and all parties understand the risks and agree with anesthesia plan.  All questions answered.   ASA Score: 3  Day of Surgery Review of History & Physical: H&P Update referred to the surgeon/provider.  Anesthesia Plan Notes: Premedication: Midazolam  Special Technique: Preemptive analgesia with   zofran, acetaminophen and tramadol  LMA with OG tube  PONV prophylaxis    Ready For Surgery From Anesthesia Perspective.     .

## 2024-01-12 ENCOUNTER — ANESTHESIA (OUTPATIENT)
Dept: SURGERY | Facility: HOSPITAL | Age: 84
End: 2024-01-12
Payer: MEDICARE

## 2024-01-12 ENCOUNTER — HOSPITAL ENCOUNTER (OUTPATIENT)
Facility: HOSPITAL | Age: 84
Discharge: HOME | End: 2024-01-12
Attending: SURGERY | Admitting: SURGERY
Payer: MEDICARE

## 2024-01-12 DIAGNOSIS — Z90.12 S/P MASTECTOMY, LEFT: Primary | ICD-10-CM

## 2024-01-12 DIAGNOSIS — Z17.0 MALIGNANT NEOPLASM OF OVERLAPPING SITES OF LEFT BREAST IN FEMALE, ESTROGEN RECEPTOR POSITIVE: ICD-10-CM

## 2024-01-12 DIAGNOSIS — C50.812 MALIGNANT NEOPLASM OF OVERLAPPING SITES OF LEFT BREAST IN FEMALE, ESTROGEN RECEPTOR POSITIVE: ICD-10-CM

## 2024-01-12 PROCEDURE — D9220A PRA ANESTHESIA: Mod: CRNA,,, | Performed by: NURSE ANESTHETIST, CERTIFIED REGISTERED

## 2024-01-12 PROCEDURE — 88307 TISSUE EXAM BY PATHOLOGIST: CPT | Performed by: SURGERY

## 2024-01-12 PROCEDURE — 71000015 HC POSTOP RECOV 1ST HR: Performed by: SURGERY

## 2024-01-12 PROCEDURE — 71000016 HC POSTOP RECOV ADDL HR: Performed by: SURGERY

## 2024-01-12 PROCEDURE — 19303 MAST SIMPLE COMPLETE: CPT | Mod: LT,,, | Performed by: SURGERY

## 2024-01-12 PROCEDURE — 88342 IMHCHEM/IMCYTCHM 1ST ANTB: CPT

## 2024-01-12 PROCEDURE — 63600175 PHARM REV CODE 636 W HCPCS: Performed by: ANESTHESIOLOGY

## 2024-01-12 PROCEDURE — D9220A PRA ANESTHESIA: Mod: ANES,,, | Performed by: ANESTHESIOLOGY

## 2024-01-12 PROCEDURE — 36000706: Performed by: SURGERY

## 2024-01-12 PROCEDURE — 25000003 PHARM REV CODE 250: Performed by: SURGERY

## 2024-01-12 PROCEDURE — 19303 MAST SIMPLE COMPLETE: CPT | Mod: AS,LT,, | Performed by: PHYSICIAN ASSISTANT

## 2024-01-12 PROCEDURE — A4216 STERILE WATER/SALINE, 10 ML: HCPCS | Performed by: SURGERY

## 2024-01-12 PROCEDURE — 63600175 PHARM REV CODE 636 W HCPCS: Mod: JZ,JG | Performed by: SURGERY

## 2024-01-12 PROCEDURE — 36000707: Performed by: SURGERY

## 2024-01-12 PROCEDURE — 25000003 PHARM REV CODE 250: Performed by: ANESTHESIOLOGY

## 2024-01-12 PROCEDURE — 37000008 HC ANESTHESIA 1ST 15 MINUTES: Performed by: SURGERY

## 2024-01-12 PROCEDURE — 37000009 HC ANESTHESIA EA ADD 15 MINS: Performed by: SURGERY

## 2024-01-12 PROCEDURE — 63600175 PHARM REV CODE 636 W HCPCS: Performed by: NURSE ANESTHETIST, CERTIFIED REGISTERED

## 2024-01-12 PROCEDURE — 27201423 OPTIME MED/SURG SUP & DEVICES STERILE SUPPLY: Performed by: SURGERY

## 2024-01-12 PROCEDURE — 71000033 HC RECOVERY, INTIAL HOUR: Performed by: SURGERY

## 2024-01-12 PROCEDURE — C1729 CATH, DRAINAGE: HCPCS | Performed by: SURGERY

## 2024-01-12 PROCEDURE — 38525 BIOPSY/REMOVAL LYMPH NODES: CPT | Mod: 51,LT,, | Performed by: SURGERY

## 2024-01-12 PROCEDURE — 38900 IO MAP OF SENT LYMPH NODE: CPT | Mod: LT,,, | Performed by: SURGERY

## 2024-01-12 PROCEDURE — 25000003 PHARM REV CODE 250: Performed by: NURSE ANESTHETIST, CERTIFIED REGISTERED

## 2024-01-12 PROCEDURE — 88341 IMHCHEM/IMCYTCHM EA ADD ANTB: CPT

## 2024-01-12 RX ORDER — LIDOCAINE HYDROCHLORIDE 10 MG/ML
1 INJECTION, SOLUTION EPIDURAL; INFILTRATION; INTRACAUDAL; PERINEURAL ONCE
Status: ACTIVE | OUTPATIENT
Start: 2024-01-12

## 2024-01-12 RX ORDER — SODIUM CHLORIDE 9 MG/ML
INJECTION, SOLUTION INTRAMUSCULAR; INTRAVENOUS; SUBCUTANEOUS
Status: DISCONTINUED | OUTPATIENT
Start: 2024-01-12 | End: 2024-01-12 | Stop reason: HOSPADM

## 2024-01-12 RX ORDER — LIDOCAINE HYDROCHLORIDE 10 MG/ML
INJECTION, SOLUTION EPIDURAL; INFILTRATION; INTRACAUDAL; PERINEURAL
Status: DISCONTINUED | OUTPATIENT
Start: 2024-01-12 | End: 2024-01-12

## 2024-01-12 RX ORDER — SODIUM CHLORIDE, SODIUM GLUCONATE, SODIUM ACETATE, POTASSIUM CHLORIDE AND MAGNESIUM CHLORIDE 30; 37; 368; 526; 502 MG/100ML; MG/100ML; MG/100ML; MG/100ML; MG/100ML
INJECTION, SOLUTION INTRAVENOUS CONTINUOUS
Status: ACTIVE | OUTPATIENT
Start: 2024-01-12 | End: 2024-02-11

## 2024-01-12 RX ORDER — CEFAZOLIN SODIUM 1 G/3ML
INJECTION, POWDER, FOR SOLUTION INTRAMUSCULAR; INTRAVENOUS
Status: DISCONTINUED | OUTPATIENT
Start: 2024-01-12 | End: 2024-01-12 | Stop reason: HOSPADM

## 2024-01-12 RX ORDER — CEFAZOLIN SODIUM 1 G/3ML
2 INJECTION, POWDER, FOR SOLUTION INTRAMUSCULAR; INTRAVENOUS ONCE
Status: DISCONTINUED | OUTPATIENT
Start: 2024-01-12 | End: 2024-01-12 | Stop reason: HOSPADM

## 2024-01-12 RX ORDER — ACETAMINOPHEN 500 MG
1000 TABLET ORAL
Status: COMPLETED | OUTPATIENT
Start: 2024-01-12 | End: 2024-01-12

## 2024-01-12 RX ORDER — LIDOCAINE HYDROCHLORIDE 10 MG/ML
INJECTION INFILTRATION; PERINEURAL
Status: DISCONTINUED
Start: 2024-01-12 | End: 2024-01-12 | Stop reason: HOSPADM

## 2024-01-12 RX ORDER — CEFAZOLIN SODIUM 1 G/3ML
INJECTION, POWDER, FOR SOLUTION INTRAMUSCULAR; INTRAVENOUS
Status: DISCONTINUED
Start: 2024-01-12 | End: 2024-01-12 | Stop reason: HOSPADM

## 2024-01-12 RX ORDER — FENTANYL CITRATE 50 UG/ML
INJECTION, SOLUTION INTRAMUSCULAR; INTRAVENOUS
Status: DISCONTINUED | OUTPATIENT
Start: 2024-01-12 | End: 2024-01-12

## 2024-01-12 RX ORDER — EPHEDRINE SULFATE 50 MG/ML
INJECTION, SOLUTION INTRAVENOUS
Status: DISCONTINUED | OUTPATIENT
Start: 2024-01-12 | End: 2024-01-12

## 2024-01-12 RX ORDER — BUPIVACAINE HYDROCHLORIDE 5 MG/ML
INJECTION, SOLUTION EPIDURAL; INTRACAUDAL
Status: DISCONTINUED
Start: 2024-01-12 | End: 2024-01-12 | Stop reason: HOSPADM

## 2024-01-12 RX ORDER — TRAMADOL HYDROCHLORIDE 50 MG/1
50 TABLET ORAL
Status: COMPLETED | OUTPATIENT
Start: 2024-01-12 | End: 2024-01-12

## 2024-01-12 RX ORDER — CEFAZOLIN SODIUM 2 G/50ML
2 SOLUTION INTRAVENOUS
Status: DISCONTINUED | OUTPATIENT
Start: 2024-01-12 | End: 2024-01-12

## 2024-01-12 RX ORDER — ONDANSETRON HYDROCHLORIDE 2 MG/ML
INJECTION, SOLUTION INTRAMUSCULAR; INTRAVENOUS
Status: DISCONTINUED | OUTPATIENT
Start: 2024-01-12 | End: 2024-01-12

## 2024-01-12 RX ORDER — MEPERIDINE HYDROCHLORIDE 25 MG/ML
6.25 INJECTION INTRAMUSCULAR; INTRAVENOUS; SUBCUTANEOUS ONCE AS NEEDED
Status: ACTIVE | OUTPATIENT
Start: 2024-01-12 | End: 2024-01-13

## 2024-01-12 RX ORDER — TRAMADOL HYDROCHLORIDE 50 MG/1
50 TABLET ORAL EVERY 4 HOURS PRN
Status: DISCONTINUED | OUTPATIENT
Start: 2024-01-12 | End: 2024-01-12 | Stop reason: HOSPADM

## 2024-01-12 RX ORDER — MIDAZOLAM HYDROCHLORIDE 1 MG/ML
2 INJECTION INTRAMUSCULAR; INTRAVENOUS ONCE AS NEEDED
Status: ACTIVE | OUTPATIENT
Start: 2024-01-12 | End: 2035-06-10

## 2024-01-12 RX ORDER — ONDANSETRON 4 MG/1
4 TABLET, ORALLY DISINTEGRATING ORAL
Status: COMPLETED | OUTPATIENT
Start: 2024-01-12 | End: 2024-01-12

## 2024-01-12 RX ORDER — SODIUM CHLORIDE, SODIUM LACTATE, POTASSIUM CHLORIDE, CALCIUM CHLORIDE 600; 310; 30; 20 MG/100ML; MG/100ML; MG/100ML; MG/100ML
INJECTION, SOLUTION INTRAVENOUS CONTINUOUS
Status: DISCONTINUED | OUTPATIENT
Start: 2024-01-12 | End: 2024-01-12 | Stop reason: HOSPADM

## 2024-01-12 RX ORDER — ONDANSETRON HYDROCHLORIDE 2 MG/ML
4 INJECTION, SOLUTION INTRAVENOUS DAILY PRN
Status: ACTIVE | OUTPATIENT
Start: 2024-01-12

## 2024-01-12 RX ORDER — TRAMADOL HYDROCHLORIDE 50 MG/1
50 TABLET ORAL EVERY 6 HOURS PRN
Qty: 28 TABLET | Refills: 0 | Status: SHIPPED | OUTPATIENT
Start: 2024-01-12 | End: 2024-01-19

## 2024-01-12 RX ORDER — SODIUM CHLORIDE, SODIUM LACTATE, POTASSIUM CHLORIDE, CALCIUM CHLORIDE 600; 310; 30; 20 MG/100ML; MG/100ML; MG/100ML; MG/100ML
INJECTION, SOLUTION INTRAVENOUS CONTINUOUS
Status: ACTIVE | OUTPATIENT
Start: 2024-01-12

## 2024-01-12 RX ORDER — LIDOCAINE HYDROCHLORIDE 10 MG/ML
INJECTION INFILTRATION; PERINEURAL
Status: DISCONTINUED | OUTPATIENT
Start: 2024-01-12 | End: 2024-01-12 | Stop reason: HOSPADM

## 2024-01-12 RX ORDER — SODIUM CHLORIDE, SODIUM LACTATE, POTASSIUM CHLORIDE, CALCIUM CHLORIDE 600; 310; 30; 20 MG/100ML; MG/100ML; MG/100ML; MG/100ML
INJECTION, SOLUTION INTRAVENOUS CONTINUOUS
Status: CANCELLED | OUTPATIENT
Start: 2024-01-12

## 2024-01-12 RX ORDER — HYDROMORPHONE HYDROCHLORIDE 2 MG/ML
0.4 INJECTION, SOLUTION INTRAMUSCULAR; INTRAVENOUS; SUBCUTANEOUS EVERY 5 MIN PRN
Status: DISPENSED | OUTPATIENT
Start: 2024-01-12

## 2024-01-12 RX ORDER — PROPOFOL 10 MG/ML
VIAL (ML) INTRAVENOUS
Status: DISCONTINUED | OUTPATIENT
Start: 2024-01-12 | End: 2024-01-12

## 2024-01-12 RX ORDER — BUPIVACAINE HYDROCHLORIDE 5 MG/ML
INJECTION, SOLUTION EPIDURAL; INTRACAUDAL
Status: DISCONTINUED | OUTPATIENT
Start: 2024-01-12 | End: 2024-01-12 | Stop reason: HOSPADM

## 2024-01-12 RX ORDER — METHOCARBAMOL 100 MG/ML
500 INJECTION, SOLUTION INTRAMUSCULAR; INTRAVENOUS ONCE AS NEEDED
Status: COMPLETED | OUTPATIENT
Start: 2024-01-12 | End: 2024-01-12

## 2024-01-12 RX ORDER — PROCHLORPERAZINE EDISYLATE 5 MG/ML
5 INJECTION INTRAMUSCULAR; INTRAVENOUS EVERY 30 MIN PRN
Status: ACTIVE | OUTPATIENT
Start: 2024-01-12

## 2024-01-12 RX ORDER — GABAPENTIN 300 MG/1
300 CAPSULE ORAL
Status: ACTIVE | OUTPATIENT
Start: 2024-01-12 | End: 2024-01-12

## 2024-01-12 RX ORDER — HYDROCODONE BITARTRATE AND ACETAMINOPHEN 5; 325 MG/1; MG/1
1 TABLET ORAL
Status: ACTIVE | OUTPATIENT
Start: 2024-01-12

## 2024-01-12 RX ADMIN — LIDOCAINE HYDROCHLORIDE 50 MG: 10 INJECTION, SOLUTION EPIDURAL; INFILTRATION; INTRACAUDAL; PERINEURAL at 10:01

## 2024-01-12 RX ADMIN — DEXTROSE MONOHYDRATE 2 G: 50 INJECTION, SOLUTION INTRAVENOUS at 10:01

## 2024-01-12 RX ADMIN — HYDROMORPHONE HYDROCHLORIDE 0.2 MG: 2 INJECTION INTRAMUSCULAR; INTRAVENOUS; SUBCUTANEOUS at 01:01

## 2024-01-12 RX ADMIN — SODIUM CHLORIDE, POTASSIUM CHLORIDE, SODIUM LACTATE AND CALCIUM CHLORIDE: 600; 310; 30; 20 INJECTION, SOLUTION INTRAVENOUS at 10:01

## 2024-01-12 RX ADMIN — SODIUM CHLORIDE, POTASSIUM CHLORIDE, SODIUM LACTATE AND CALCIUM CHLORIDE: 600; 310; 30; 20 INJECTION, SOLUTION INTRAVENOUS at 01:01

## 2024-01-12 RX ADMIN — PROPOFOL 100 MG: 10 INJECTION, EMULSION INTRAVENOUS at 10:01

## 2024-01-12 RX ADMIN — ACETAMINOPHEN 1000 MG: 500 TABLET ORAL at 09:01

## 2024-01-12 RX ADMIN — ONDANSETRON 4 MG: 2 INJECTION INTRAMUSCULAR; INTRAVENOUS at 11:01

## 2024-01-12 RX ADMIN — FENTANYL CITRATE 50 MCG: 50 INJECTION, SOLUTION INTRAMUSCULAR; INTRAVENOUS at 10:01

## 2024-01-12 RX ADMIN — SODIUM CHLORIDE, POTASSIUM CHLORIDE, SODIUM LACTATE AND CALCIUM CHLORIDE: 600; 310; 30; 20 INJECTION, SOLUTION INTRAVENOUS at 09:01

## 2024-01-12 RX ADMIN — TRAMADOL HYDROCHLORIDE 50 MG: 50 TABLET, COATED ORAL at 09:01

## 2024-01-12 RX ADMIN — EPHEDRINE SULFATE 10 MG: 50 INJECTION INTRAVENOUS at 11:01

## 2024-01-12 RX ADMIN — ONDANSETRON 4 MG: 4 TABLET, ORALLY DISINTEGRATING ORAL at 09:01

## 2024-01-12 RX ADMIN — METHOCARBAMOL 500 MG: 100 INJECTION INTRAMUSCULAR; INTRAVENOUS at 12:01

## 2024-01-12 RX ADMIN — FENTANYL CITRATE 50 MCG: 50 INJECTION, SOLUTION INTRAMUSCULAR; INTRAVENOUS at 11:01

## 2024-01-12 NOTE — ANESTHESIA PROCEDURE NOTES
Intubation    Date/Time: 1/12/2024 10:43 AM    Performed by: Geoff Martinez II, CRNA  Authorized by: Carla Shearer MD    Intubation:     Induction:  Intravenous    Intubated:  Postinduction    Mask Ventilation:  Easy mask    Attempts:  1    Attempted By:  CRNA    Difficult Airway Encountered?: No      Complications:  None    Airway Device:  Supraglottic airway/LMA    Airway Device Size:  3.0    Style/Cuff Inflation:  Cuffed    Inflation Amount (mL):  0    Placement Verified By:  Capnometry    Complicating Factors:  None    Findings Post-Intubation:  BS equal bilateral and atraumatic/condition of teeth unchanged

## 2024-01-12 NOTE — TRANSFER OF CARE
"Anesthesia Transfer of Care Note    Patient: Karo Lakhani    Procedure(s) Performed: Procedure(s) (LRB):  MASTECTOMY, SIMPLE - LEFT (Left)  BIOPSY, LYMPH NODE, SENTINEL - LEFT MagTrace Injected in office SentiMag needed No NUC MED (Left)  LYMPHADENECTOMY, AXILLARY - LEFT (Left)    Patient location: PACU    Anesthesia Type: general    Transport from OR: Transported from OR on room air with adequate spontaneous ventilation    Post pain: adequate analgesia    Post assessment: no apparent anesthetic complications    Post vital signs: stable    Level of consciousness: responds to stimulation    Nausea/Vomiting: no nausea/vomiting    Complications: none    Transfer of care protocol was followed      Last vitals: Visit Vitals  /75   Pulse 69   Temp 36.8 °C (98.2 °F) (Oral)   Resp 18   Ht 5' 4" (1.626 m)   Wt 52.6 kg (115 lb 15.4 oz)   SpO2 97%   Breastfeeding No   BMI 19.90 kg/m²     "

## 2024-01-12 NOTE — PLAN OF CARE
"CONTACT:  Zoya Muñoz RN    Utilization Management Dept.   [facility]  [address] [place] [address]    Phone:[phone]  Fax: [phone]    REQUEST FOR ADDITIONAL DAYS  REF # [id number]      Aidee Sanchez (57 y.o. Female)     Date of Birth Social Security Number Address Home Phone MRN    1964  [address] [place], [place] [id number] [phone] [id number]    Buddhist Marital Status          Mormon        Admission Date Admission Type Admitting Provider Attending Provider Department, Room/Bed    7/17/21 Emergency Gill Sanchez DO Heinss, Karl F, MD [address], [address]50/[address]S    Discharge Date Discharge Disposition Discharge Destination                       Attending Provider: Jeff Graves MD    Allergies: Nuts, Codeine, Latex    Isolation: None   Infection: None   Code Status: CPR    Ht: 144.8 cm (57\")   Wt: 111 kg (245 lb 3.2 oz)    Admission Cmt: None   Principal Problem: Closed fracture of right hip (CMS/HCC) [S72.001A]                 Active Insurance as of 7/17/2021     Primary Coverage     Payor Plan Insurance Group Employer/Plan Group    [facility]      Payor Plan Address Payor Plan Phone Number Payor Plan Fax Number Effective Dates    [address] [address]   7/1/2015 - None Entered    [place] [address]       Subscriber Name Subscriber Birth Date Member ID       AIDEE SANCHEZ 1964 [id number]                 Emergency Contacts      (Rel.) Home Phone Work Phone Mobile Phone    NA CACERES (Daughter) [phone] -- --            Vital Signs (last day)     Date/Time   Temp   Temp src   Pulse   Resp   BP   Patient Position   SpO2    08/04/21 1027   97.9 (36.6)   Oral   71   18   124/52   Lying   --    08/04/21 0835   --   --   78   --   117/71   --   --    08/04/21 0635   97.8 (36.6)   Oral   66   18   98/50   Lying   96    08/04/21 0300   98.2 (36.8)   Oral   72   18   106/59   " Discharge criteria met per Brijesh and MD.  Will transfer the patient to phase 2.   Lying   97    08/04/21 0156   --   --   71   --   135/61   Lying   --    08/03/21 1848   98 (36.7)   Oral   73   16   155/66   Lying   94    08/03/21 1433   98 (36.7)   Oral   72   18   112/58   Lying   --    08/03/21 1037   97.7 (36.5)   Oral   68   18   125/65   Lying   --    08/03/21 0628   98.1 (36.7)   Oral   69   18   116/59   Lying   --    08/03/21 0300   97.8 (36.6)   Oral   70   18   122/60   Lying   96              Current Facility-Administered Medications   Medication Dose Route Frequency Provider Last Rate Last Admin   • acetaminophen (TYLENOL) tablet 650 mg  650 mg Oral Q6H PRN Jose Alfredo Taylor DO   650 mg at 07/31/21 1047   • apixaban (ELIQUIS) tablet 2.5 mg  2.5 mg Oral Q12H Oracio Ponce DO   2.5 mg at 08/04/21 0835   • aspirin EC tablet 81 mg  81 mg Oral Daily Jeff Graves MD   81 mg at 08/04/21 0836   • bisacodyl (DULCOLAX) suppository 10 mg  10 mg Rectal Daily PRN Brittany Colon PA-C   10 mg at 07/24/21 1540   • calcium carbonate (TUMS) chewable tablet 500 mg (200 mg elemental)  2 tablet Oral TID PRN Oracio Ponce DO       • cetirizine (zyrTEC) tablet 10 mg  10 mg Oral Daily Oracio Ponce DO   10 mg at 08/04/21 0836   • cholecalciferol (VITAMIN D3) capsule 50,000 Units  50,000 Units Oral Q7 Days Oracio Ponce DO   50,000 Units at 07/30/21 0826   • dextrose (D50W) 25 g/ 50mL Intravenous Solution 25 g  25 g Intravenous Q15 Min PRN Oracio Ponce DO       • dextrose (GLUTOSE) oral gel 15 g  15 g Oral Q15 Min PRN Oracio Ponce,        • Diclofenac Sodium (VOLTAREN) 1 % gel 4 g  4 g Topical 4x Daily PRN Oracio Ponce,        • ferrous sulfate tablet 325 mg  325 mg Oral Daily With Breakfast Oracio Ponce DO   325 mg at 08/04/21 0836   • furosemide (LASIX) tablet 40 mg  40 mg Oral Daily Ignacia Solitario PA   40 mg at 08/04/21 0835   • gabapentin (NEURONTIN) capsule 400 mg  400 mg Oral Q12H Oracio Ponce DO   400 mg at 08/04/21 0838   • glucagon (human  recombinant) (GLUCAGEN DIAGNOSTIC) injection 1 mg  1 mg Subcutaneous Q15 Min PRN Oracio Ponce, DO       • HYDROcodone-acetaminophen (NORCO) 5-325 MG per tablet 1 tablet  1 tablet Oral BID PRN Ignacia Solitario PA   1 tablet at 08/04/21 0157   • hydrOXYzine (ATARAX) tablet 25 mg  25 mg Oral TID PRN Oracio Ponce DO   25 mg at 08/01/21 0902   • insulin aspart (novoLOG) injection 0-7 Units  0-7 Units Subcutaneous TID AC Oracio Ponce DO   2 Units at 08/03/21 1642   • levothyroxine (SYNTHROID, LEVOTHROID) tablet 25 mcg  25 mcg Oral Daily Oracio Ponce DO   25 mcg at 08/04/21 0835   • Magnesium Sulfate 2 gram Bolus, followed by 8 gram infusion (total Mg dose 10 grams)- Mg less than or equal to 1mg/dL  2 g Intravenous PRN Brittany Colon PA-C        Or   • Magnesium Sulfate 2 gram / 50mL Infusion (GIVE X 3 BAGS TO EQUAL 6GM TOTAL DOSE) - Mg 1.1 - 1.5 mg/dl  2 g Intravenous PRN Brittany Colon PA-C        Or   • Magnesium Sulfate 4 gram infusion- Mg 1.6-1.9 mg/dL  4 g Intravenous PRN Brittany Colon PA-C       • metoprolol tartrate (LOPRESSOR) tablet 25 mg  25 mg Oral Q12H Oracio Ponce, DO   25 mg at 08/03/21 2042   • naloxone (NARCAN) injection 0.1 mg  0.1 mg Intravenous Q5 Min PRN Oracio Ponce DO       • nitroglycerin (NITROSTAT) SL tablet 0.4 mg  0.4 mg Sublingual Q5 Min PRN Oracio Ponce, DO       • pantoprazole (PROTONIX) EC tablet 40 mg  40 mg Oral Q AM Oracio Ponce DO   40 mg at 08/04/21 0520   • polyethylene glycol (MIRALAX) packet 17 g  17 g Oral Daily PRN Ignacia Solitario PA       • potassium chloride (K-DUR,KLOR-CON) CR tablet 40 mEq  40 mEq Oral PRN Brittany Colon PA-C   40 mEq at 07/25/21 0531    Or   • potassium chloride (KLOR-CON) packet 40 mEq  40 mEq Oral PRN Brittany Colon PA-C        Or   • potassium chloride 10 mEq in 100 mL IVPB  10 mEq Intravenous Q1H PRN Brittany Colon PA-C       • prochlorperazine (COMPAZINE) injection 5 mg  5  mg Intravenous Q6H PRN Rosario, Oracio K, DO   5 mg at 07/31/21 1048   • QUEtiapine (SEROquel) tablet 25 mg  25 mg Oral Nightly Brittany Colon PA-C   25 mg at 08/03/21 2042   • sennosides-docusate (PERICOLACE) 8.6-50 MG per tablet 2 tablet  2 tablet Oral BID PRN Ignacia Solitario PA       • sertraline (ZOLOFT) tablet 50 mg  50 mg Oral Daily Erikon, Oracio K, DO   50 mg at 08/04/21 0836   • sodium chloride 0.9 % flush 10 mL  10 mL Intravenous PRN Franson, Oracio K, DO       • sodium chloride 0.9 % flush 10 mL  10 mL Intravenous Q12H Franson, Oracio K, DO   10 mL at 08/04/21 0920   • sodium chloride 0.9 % flush 10 mL  10 mL Intravenous PRN Erikon, Oracio K, DO       • sodium chloride 0.9 % flush 3 mL  3 mL Intravenous Q12H Franson, Oracio K, DO   3 mL at 08/04/21 0920     Lab Results (last 24 hours)     Procedure Component Value Units Date/Time    POC Glucose Once [[id number]]  (Abnormal) Collected: 08/04/21 1057    Specimen: Blood Updated: 08/04/21 1103     Glucose 133 mg/dL      Comment: Meter: [id number] : [id number] PARAG LERNER       POC Glucose Once [[id number]]  (Normal) Collected: 08/04/21 0642    Specimen: Blood Updated: 08/04/21 0649     Glucose 130 mg/dL      Comment: Meter: [id number] : [id number] Jennifer Tomas       Basic Metabolic Panel [[id number]]  (Abnormal) Collected: 08/04/21 0150    Specimen: Blood Updated: 08/04/21 0244     Glucose 117 mg/dL      BUN 10 mg/dL      Creatinine 0.65 mg/dL      Sodium 138 mmol/L      Potassium 4.0 mmol/L      Comment: Slight hemolysis detected by analyzer. Results may be affected.        Chloride 104 mmol/L      CO2 24.6 mmol/L      Calcium 9.0 mg/dL      eGFR Non African Amer 94 mL/min/1.73      BUN/Creatinine Ratio 15.4     Anion Gap 9.4 mmol/L     Narrative:      GFR Normal >60  Chronic Kidney Disease <60  Kidney Failure <15      Magnesium [[id number]]  (Normal) Collected: 08/04/21 0150    Specimen: Blood Updated: 08/04/21 0244     Magnesium 1.7 mg/dL     CBC  (No Diff) [[id number]]  (Abnormal) Collected: 21 0150    Specimen: Blood Updated: 21 0213     WBC 8.45 10*3/mm3      RBC 3.18 10*6/mm3      Hemoglobin 9.8 g/dL      Hematocrit 32.8 %      .1 fL      MCH 30.8 pg      MCHC 29.9 g/dL      RDW 15.0 %      RDW-SD 55.8 fl      MPV 10.1 fL      Platelets 279 10*3/mm3     POC Glucose Once [[id number]]  (Normal) Collected: 21 2044    Specimen: Blood Updated: 21 205     Glucose 101 mg/dL      Comment: Meter: [id number] : [id number] SCAR ENRIQUE       POC Glucose Once [[id number]]  (Abnormal) Collected: 21 1637    Specimen: Blood Updated: 21 1644     Glucose 163 mg/dL      Comment: Meter: [id number] : [id number] PARAG LERNER           Imaging Results (Last 24 Hours)     ** No results found for the last 24 hours. **        ECG/EMG Results (last 24 hours)     ** No results found for the last 24 hours. **           Physician Progress Notes (most recent note)      Brittany Colon PA-C at 21 1055          Patient Identification:  Name:  Aidee Trinidad  Age:  57 y.o.  Sex:  female  :  1964  MRN:  [id number]  Visit Number:  [id number]  Primary Care Provider:  Azalea Burnett APRN    Length of stay:  18    Subjective/Interval History/Consultants/Procedures     Chief complaint: Follow-up hip fracture, weakness    HPI/Hospital course:     Aidee Trinidad is a 57-year-old female with PMH significant for known fatty liver disease, diabetes, depression, coronary artery disease, hypertension.  She presented to urgency department at [facility] on 2021 for further evaluation of right-sided hip pain after sustaining a fall from the shower when standing up from her shower chair when turning water off.  She landed on her right side and was wedged between chair and while the shower.  Her son was reportedly home and called an ambulance.  She was brought to the emergency department at [facility]  [facility] for further evaluation and treatment.  Of note, she did also break her right ankle around 1 month ago.     Mrs. Trinidad underwent orthopedic surgery with right hip cephalo-medullary nailing with FNA nail.  She continued to have difficulty ambulating following this with reports of ongoing weakness throughout her body.   She underwent MRI of her L-spine withouth known significant source of weakness.     She developed urinary retention while hospitalized and required troy catheter to be anchored on 07/23/21 AM.  She later underwent bladder training with troy catheter removal. Urinalysis obtained revealed 25,000 CFU yeast and less than 10,000 CFU gram-positive cocci.  Not felt to be an acute urinary tract infection, thus was not treated.    Given prior right knee surgery and now right hip fracture with prior right lower extremity weakness, Mrs. Trinidad has had some slowed progression with ambulatory abilities.  She was denied inpatient rehab while hospitalized.   She initially thought she may go home with her daughter.  She has continued to work with physical therapy.       Consultants:   Consults     Date and Time Order Name Status Description    8/4/2021 11:06 AM Inpatient Orthopedic Surgery Consult      7/17/2021  7:52 PM Ortho (on-call MD unless specified) Completed             Procedures:   · 7/18/2021: Right hip cephalo-medullary nailing with TFNA nail -- Dr. Ponce, orthopedic surgery   · 7/23/2021: Transthoracic echocardiogram   · Normal left ventricular cavity size and wall thickness noted.  · Left ventricular ejection fraction appears to be 56 - 60%. Left ventricular systolic function is normal.  · Left ventricular diastolic function is consistent with (grade II w/high LAP) pseudonormalization.  · Normal cardiac chamber dimensions.  · The aortic valve is structurally normal. Doppler interrogation was not done across the aortic valve.  · The mitral valve is structurally normal with no regurgitation or  significant stenosis present  · Mild tricuspid valve regurgitation is present.  · Estimated right ventricular systolic pressure from tricuspid regurgitation is mildly elevated (35-45 mmHg). Mild pulmonary hypertension is present.  · The pulmonic valve is not well visualized.  · There is a trivial pericardial effusion.  · 7/23/2021: Velazquez catheter insertion d/t urinary retention   · 7/29/2021: Velazquez catheter removal        Subjective:      Today we discuss improved ambulation with walker to bedside commode.  She reports her daughter does not want her to come home, as she continues to feel her legs are too weak.   She denies chest pain,dyspnea, cough, and wheeze.       Discussed with AM DWIGHT Callahan without known significant events overnight or this morning.     ----------------------------------------------------------------------------------------------------------------------  Current [facility] Meds:  apixaban, 2.5 mg, Oral, Q12H  aspirin, 81 mg, Oral, Daily  cetirizine, 10 mg, Oral, Daily  cholecalciferol, 50,000 Units, Oral, Q7 Days  ferrous sulfate, 325 mg, Oral, Daily With Breakfast  furosemide, 40 mg, Oral, Daily  gabapentin, 400 mg, Oral, Q12H  insulin aspart, 0-7 Units, Subcutaneous, TID AC  levothyroxine, 25 mcg, Oral, Daily  metoprolol tartrate, 25 mg, Oral, Q12H  pantoprazole, 40 mg, Oral, Q AM  QUEtiapine, 25 mg, Oral, Nightly  sertraline, 50 mg, Oral, Daily  sodium chloride, 10 mL, Intravenous, Q12H  sodium chloride, 3 mL, Intravenous, Q12H         ----------------------------------------------------------------------------------------------------------------------      Objective     Vital Signs:  Temp:  [97.8 °F (36.6 °C)-98.2 °F (36.8 °C)] 97.9 °F (36.6 °C)  Heart Rate:  [66-78] 71  Resp:  [16-18] 18  BP: ()/(50-71) 124/52      07/17/21  1849 07/17/21  2224 07/18/21  0500   Weight: 107 kg (235 lb) 111 kg (245 lb 3.2 oz) 111 kg (245 lb 3.2 oz)     Body mass index is 53.06 kg/m².    Intake/Output  Summary (Last 24 hours) at 8/4/2021 1109  Last data filed at 8/4/2021 1027  Gross per 24 hour   Intake 1440 ml   Output --   Net 1440 ml     I/O this shift:  In: 360 [P.O.:360]  Out: -   Diet Regular; Consistent Carbohydrate  ----------------------------------------------------------------------------------------------------------------------    Physical Exam  Vitals and nursing note reviewed.   Constitutional:       Appearance: Normal appearance.   HENT:      Head: Normocephalic and atraumatic.   Eyes:      General: Lids are normal.      Extraocular Movements: Extraocular movements intact.      Pupils: Pupils are equal, round, and reactive to light.   Cardiovascular:      Rate and Rhythm: Normal rate and regular rhythm.      Heart sounds: S1 normal and S2 normal.   Pulmonary:      Effort: No tachypnea or bradypnea.      Breath sounds: No decreased breath sounds, wheezing, rhonchi or rales.   Abdominal:      General: Bowel sounds are normal.      Palpations: Abdomen is soft.      Tenderness: There is no abdominal tenderness.   Musculoskeletal:      Cervical back: Full passive range of motion without pain.   Skin:     General: Skin is warm and dry.   Neurological:      Mental Status: She is alert and oriented to person, place, and time.   Psychiatric:         Attention and Perception: Attention normal.         Mood and Affect: Mood normal.         Behavior: Behavior normal.              ----------------------------------------------------------------------------------------------------------------------  Tele:        ----------------------------------------------------------------------------------------------------------------------      Results from last 7 days   Lab Units 08/04/21  0150 08/03/21  0443 08/02/21  0404   WBC 10*3/mm3 8.45 7.70 7.48   HEMOGLOBIN g/dL 9.8* 9.7* 9.2*   HEMATOCRIT % 32.8* 32.1* 30.0*   MCV fL 103.1* 101.3* 100.0*   MCHC g/dL 29.9* 30.2* 30.7*   PLATELETS 10*3/mm3 065 024 742          Results from last 7 days   Lab Units 08/04/21  0150 08/03/21  0443 08/02/21  0404 07/30/21  0820 07/29/21  0912   SODIUM mmol/L 138 139 138   < > 141   POTASSIUM mmol/L 4.0 3.8 4.0   < > 4.3   MAGNESIUM mg/dL 1.7 2.0 1.6   < >  --    CHLORIDE mmol/L 104 105 104   < > 105   CO2 mmol/L 24.6 25.8 24.9   < > 27.3   BUN mg/dL 10 8 9   < > 7   CREATININE mg/dL 0.65 0.51* 0.56*   < > 0.60   EGFR IF NONAFRICN AM mL/min/1.73 94 125 112   < > 103   CALCIUM mg/dL 9.0 9.0 8.8   < > 8.8   GLUCOSE mg/dL 117* 99 130*   < > 153*   ALBUMIN g/dL  --  2.89*  --   --  2.59*   BILIRUBIN mg/dL  --  1.0  --   --  1.0   ALK PHOS U/L  --  284*  --   --  175*   AST (SGOT) U/L  --  43*  --   --  41*   ALT (SGPT) U/L  --  22  --   --  26    < > = values in this interval not displayed.   Estimated Creatinine Clearance: 109.4 mL/min (by C-G formula based on SCr of 0.65 mg/dL).  No results found for: AMMONIA      No results found for: BLOODCX  No results found for: URINECX  No results found for: WOUNDCX  No results found for: STOOLCX  --------------------------------------------------------------------------------  ----------------------------------------------------------------------------------------------------------------------   I have reviewed the above laboratory values for 08/04/21    Assessment/Plan     Active Hospital Problems    Diagnosis  POA   • **Closed fracture of right hip (CMS/HCC) [S72.001A]  Yes   • CAD (coronary artery disease) [I25.10]  Yes   • Essential hypertension [I10]  Yes   • Fatty liver [K76.0]  Yes         ASSESSMENT/PLAN:  Acute, minimally displaced right proximal intertrochanteric femur fracture s/p mechanical fall at home, s/p repair 7/18/2021  -History of right lateral malleolus avulsion fracture with tiny fragmentation 4/2021  -History of TKA right in past   -Generalized weakness, debility, inability to complete ADLs currently due to acute fracture   · Pt is s/p ORIF 7/18; Supposed to have two week follow-up  with orthopedics.  This time has now passed given extended hospitalization.   · Will ask ortho to see for 2-week follow-up.   · Cont PRN pain medication with holding parameters   · PT on board,rehab declined.   · Swing bed consult placed.   · Patient does report neuropathy in RLE, CT lumbar spine with no acute abnormalities. She has good motor function on exam   · MRI L spine without known acute source of weakness.    · Supportive care   · WBAT   · Eliquis on board for DVT proph for 35 days post-op.   · Vitamin D adequate    · NV checks   · Ortho input/assistance is appreciated.      RLE > LLE weakness, neuropathy   · No focal deficits    · Head CT previously unremarkable 7/21  · MRI lumbar spine 7/23 with no acute abnormalities   · Cont Neurontin   · ?outpatient neuro referral      -Right foot pain   · Pt with history of right lateral malleolus avulsion fx 4/2021  · Reports pain in right foot similar to gout episode in past, more pain towards her great toe on right foot   · XR unremarkable, uric acid level not elevated  · Supportive care   · PRN pain medication      -Hypokalemia   -Borderline hypomagnesemia   · Resolved with supplementation   · Replace electrolytes per protocol as necessary   · Repeat BMP and mag level in AM     -Constipation, resolved   · Continue bowel regimen, change to PRN as pt complains of abdominal cramping and nausea      -Urinary retention, resolved   · Patient with urinary retention earlier in hospitalization requiring anchoring of troy catheter   · Patient also with generalized weakness and constipation, as such MRI of lumbar spine obtained previously with no acute findings.   · Urinalysis with 25K CFU yeast and <10K CFU gram positive cocci . No treatment warranted at this time, discussed with Dr. Taylor (attending physician)  · Suspect that urine retention will improve with mobilization.   · Bladder training completed, troy catheter previously removed. She is now voiding on her own.       -Macrocytic anemia   -Thrombocytopenia, resolved   · Likely ABLA post op  · Hemoglobin stable   · Platelets stable, ultrasound of spleen previously unremarkable  · No active bleeding noted or reported   · Vitamin B12 and folate WNL. Iron profile with slightly low iron and iron sat but stable ferritin. Pt previously started on PO iron, will continue.   · Monitor closely, repeat CBC in AM     -Transaminitis, felt to be 2/2 CPK elevation   -Hyperbilirubinemia, stable   -Hypoalbuminemia  -Hepatitis B IgM/IgG  · Abdomen non tender   · LFTs stable   · US Liver with marked cirrhosis said to be advanced with nodular liver margins.   · US spleen unremarkable.  · CPK improved  · Avoid nephrotoxins as much as possible   · Repeat chem panel in AM     -Grade II diastolic dysfunction   · ECHO reviewed   · Appears grossly compensated   · Monitor volume status closely   · Restart home Lasix on 8/2/21     -Essential hypertension  · BP appears well controlled   · Continue home metoprolol with appropriate holding parameters to prevent hypotension and/or bradycardia   · Home lasix previously held.  Blood pressure and renal function stable, as such we will resume home Lasix dose.  · Closely monitor BP per hospital protocol, titrate medications as necessary     -History of coronary artery disease s/p CABG in past   -Cardiomegaly   · Patient denies any chest pain   · Statin held previously due to elevated CPK level. CPK still elevated, but significantly improved.  · TTE obtained with preserved EF and grade II diastolic dysfunction   · Eliquis on board for VTE proph, cont daily aspirin  · Home lasix restarted  · Monitor closely on telemetry      -Type II diabetes mellitus, non insulin dependent  -Peripheral neuropathy   · HgbA1C 6.1, well controlled. BG levels controlled.   · Continue home Metformin for now   · Continue low dose SSI, titrate dosage as necessary   · Closely monitor blood glucose levels with accuchecks QAC and  QHS  · Hypoglycemia protocol in place should it be necessary  · Continue home Neurontin for neuropathy      -Hypothyroidism   · TSH adequately replaced   · Continue home dose of levothyroxine      -Depression   -Anxiety   · Supportive care   · Continue home Zoloft      -Morbid obesity, BMI 53.06  · Supportive care   · Affecting all aspects of care       -----------  -DVT prophylaxis: Eliquis 2.5mg BID  -GI prophylaxis: PPI  -Activity: WBAT  -Disposition:SS on board. Insurance declined rehab. Swing bed consulted.  Discussed with SS.   -Diet:   Dietary Orders (From admission, onward)     Start     Ordered    21 1112  Diet Regular; Consistent Carbohydrate  Diet Effective Now     Question Answer Comment   Diet Texture / Consistency Regular    Common Modifiers Consistent Carbohydrate        21 1111                    The patient is high risk due to the following diagnoses/reasons:  Recent right hip fracture s/p repair, difficulty mobilizing, Essential HTN,         Brittany Colon PA-C  21  11:09 EDT  Pager # [phone]      Electronically signed by Brittany Colon PA-C at 21 1109          Consult Notes (most recent note)      Josefina Gaytan APRN at 21 0819      Consult Orders    1. Ortho (on-call MD unless specified) [[id number]] ordered by Gill Sanchez DO          Attestation signed by Oracio Ponce DO at 21 1222    I have reviewed this documentation and agree.                    Inpatient Orthopedic Surgery Consult  Consult performed by: Josefina Gaytan APRN  Consult ordered by: Gill Sanchez DO          Patient Identification:  Name:  Aidee Trinidad  Age:  57 y.o.  Sex:  female  :  1964  MRN:  [id number]  Visit Number:  [id number]  Primary care provider:  Azalea Burnett APRN    History of presenting illness:   Patient is a 57 year old female who was admitted yesterday secondary to acute right hip fracture. Patient states  she was at home in the shower and stood up from her shower chair to turn the water off and fell landing on the right side. Her family was able to call an ambulance after the fall secondary to patient being unable to get up by herself and weight bear on the right leg due to pain.     At baseline patient is currently in outpatient physical therapy and ambulates with a Rollator walker. Patient has history of chronic right sided weakness without specific reason. She has a past medical history significant for CAD, essential hypertension, hypothyroidism, DM II and fatty liver disease.     Patient was noted to have acute intertrochanteric femur fracture of the right side in the emergency department. She was admitted to hospitalist service. She has been NPO after midnight in anticipation for surgery.     Review of Systems:     Constitution: No weight gain  Eyes:  No loss of vision  ENT:  no epistaxis  Respiratory:  no hemoptysis  Cardiovascular: no CP, no SOB.  Gastrointestinal: No abdominal pain  Genitourinary: No hematuria  Integument: No skin rash  Hematologic / Lymphatic: No petechiae  Musculoskeletal: See HPI  Neurological: No seizures   Endocrine: No tremors    Past History:  Family History   Problem Relation Age of Onset   • COPD Mother    • Stroke Mother    • Cancer Brother    • Diabetes Brother    • Hypertension Brother    • Heart disease Brother      Past Medical History:   Diagnosis Date   • Arthritis    • Chronic back pain    • Coronary artery disease    • Depression    • Diabetes mellitus (CMS/HCC)    • Fatty liver    • Hypertension      Past Surgical History:   Procedure Laterality Date   • CARPAL TUNNEL RELEASE     •  SECTION     • CHOLECYSTECTOMY     • CORONARY ANGIOPLASTY WITH STENT PLACEMENT     • REPLACEMENT TOTAL KNEE Right    • TUBAL ABDOMINAL LIGATION       Social History     Socioeconomic History   • Marital status:      Spouse name: Not on file   • Number of children: Not on file   •  Years of education: Not on file   • Highest education level: Not on file   Tobacco Use   • Smoking status: Never Smoker   • Smokeless tobacco: Never Used   Substance and Sexual Activity   • Alcohol use: Yes     Comment: Once a year    • Drug use: No   • Sexual activity: Defer       Allergies:  Nuts, Codeine, and Latex    Prior to Admission Medications     Prescriptions Last Dose Informant Patient Reported? Taking?    atorvastatin (LIPITOR) 20 MG tablet 7/16/2021 Self Yes Yes    Take 20 mg by mouth Every Night.    cefdinir (OMNICEF) 300 MG capsule 7/17/2021 Self Yes Yes    Take 300 mg by mouth 2 (Two) Times a Day.    Cetirizine HCl (ZYRTEC ALLERGY) 10 MG capsule 7/17/2021 Self Yes Yes    Take 10 mg by mouth Daily.    cholecalciferol (VITAMIN D3) 1.25 MG (59725 UT) capsule 7/16/2021 Self Yes Yes    Take 50,000 Units by mouth Every 7 (Seven) Days. Prior to [facility] Admission, Patient was on: Pt takes each Friday    Diclofenac Sodium (VOLTAREN) 1 % gel gel 7/17/2021 Self Yes Yes    Apply 4 g topically to the appropriate area as directed 4 (Four) Times a Day As Needed.    furosemide (LASIX) 40 MG tablet 7/17/2021 Self Yes Yes    Take 40 mg by mouth Daily.    gabapentin (NEURONTIN) 400 MG capsule 7/17/2021 Self Yes Yes    Take 400 mg by mouth Every 12 (Twelve) Hours.    HYDROcodone-acetaminophen (NORCO) 5-325 MG per tablet 7/17/2021 Self Yes Yes    Take 1 tablet by mouth 2 (Two) Times a Day As Needed for Moderate Pain .    levothyroxine (SYNTHROID, LEVOTHROID) 25 MCG tablet 7/17/2021 Self Yes Yes    Take 25 mcg by mouth Daily.    metFORMIN (GLUCOPHAGE) 500 MG tablet 7/17/2021 Self Yes Yes    Take 500 mg by mouth 2 (Two) Times a Day With Meals.    metoprolol tartrate (LOPRESSOR) 25 MG tablet 7/17/2021 Self Yes Yes    Take 25 mg by mouth 2 (Two) Times a Day.    potassium chloride (K-DUR,KLOR-CON) 20 MEQ CR tablet 7/17/2021 Self Yes Yes    Take 20 mEq by mouth 2 (Two) Times a Day.    QUEtiapine (SEROquel) 50 MG tablet  7/16/2021 Self Yes Yes    Take 50 mg by mouth Every Night.    sertraline (ZOLOFT) 50 MG tablet 7/17/2021 Self Yes Yes    Take 50 mg by mouth Daily.        Hospital Meds:  atorvastatin, 20 mg, Oral, Nightly  cetirizine, 10 mg, Oral, Daily  [START ON 7/23/2021] cholecalciferol, 50,000 Units, Oral, Q7 Days  gabapentin, 400 mg, Oral, Q12H  insulin aspart, 0-7 Units, Subcutaneous, TID AC  levothyroxine, 25 mcg, Oral, Daily  metoprolol tartrate, 25 mg, Oral, Q12H  pantoprazole, 40 mg, Oral, Q AM  QUEtiapine, 50 mg, Oral, Nightly  sertraline, 50 mg, Oral, Daily  sodium chloride, 10 mL, Intravenous, Q12H      sodium chloride, 50 mL/hr, Last Rate: 50 mL/hr (07/17/21 2325)        Vital Signs:  Temp:  [97.5 °F (36.4 °C)-98 °F (36.7 °C)] 98 °F (36.7 °C)  Heart Rate:  [74-98] 81  Resp:  [18-20] 18  BP: (105-166)/(65-84) 166/81      07/17/21  1849 07/17/21  2224 07/18/21  0500   Weight: 107 kg (235 lb) 111 kg (245 lb 3.2 oz) 111 kg (245 lb 3.2 oz)     Body mass index is 53.06 kg/m².     Physical exam:  Constitutional:  Well-developed and well-nourished.  No respiratory distress.      HENT:  Head: Normocephalic and atraumatic. Bilateral PERRLA.   Neck:  Neck supple. No lymphadenopathy.   Cardiovascular:  Normal rate  Pulmonary/Chest:  No respiratory distress  Abdominal:  Soft, no tenderness.   Musculoskeletal:  Right hip pain noted with flexion or abduction, no significant effusion, discoloration or open wounds. + pedal pulses noted. Mild effusion RLE. Cap refill <3 seconds. Neurovascularly intact RLE to light touch sensation.   Neurological:  Alert and oriented to person, place, and time.   Skin:  Skin is warm and dry  Psychiatric:  Normal mood and affect  Peripheral vascular:  No edema     Results from last 7 days   Lab Units 07/18/21  0001 07/17/21  1907   WBC 10*3/mm3 9.23 7.48   HEMOGLOBIN g/dL 13.1 13.8   HEMATOCRIT % 40.5 42.0   MCV fL 97.4* 96.1   MCHC g/dL 32.3 32.9   PLATELETS 10*3/mm3 143 149         Results from last 7  days   Lab Units 07/18/21  0001 07/17/21 1907   SODIUM mmol/L 142 140   POTASSIUM mmol/L 4.1 4.3   CHLORIDE mmol/L 104 104   CO2 mmol/L 26.9 28.8   BUN mg/dL 10 8   CREATININE mg/dL 0.86 0.74   EGFR IF NONAFRICN AM mL/min/1.73 68 81   CALCIUM mg/dL 9.2 9.5   GLUCOSE mg/dL 156* 84   ALBUMIN g/dL 3.58 3.74   BILIRUBIN mg/dL 1.2 1.1   ALK PHOS U/L 167* 188*   AST (SGOT) U/L 64* 63*   ALT (SGPT) U/L 50* 53*   Estimated Creatinine Clearance: 82.7 mL/min (by C-G formula based on SCr of 0.86 mg/dL).  No results found for: AMMONIA  Results from last 7 days   Lab Units 07/17/21 1907   TROPONIN T ng/mL <0.010     Results from last 7 days   Lab Units 07/17/21 1907   PROBNP pg/mL 130.4     Lab Results   Component Value Date    HGBA1C 6.10 (H) 07/18/2021     Lab Results   Component Value Date    TSH 2.280 07/18/2021     No results found for: PREGTESTUR, PREGSERUM, HCG, HCGQUANT  Pain Management Panel     Pain Management Panel Latest Ref Rng & Units 2/28/2020    AMPHETAMINES SCREEN, URINE Negative Negative    BARBITURATES SCREEN Negative Negative    BENZODIAZEPINE SCREEN, URINE Negative Negative    BUPRENORPHINEUR Negative Negative    COCAINE SCREEN, URINE Negative Negative    METHADONE SCREEN, URINE Negative Negative              Imaging Results (Last 7 Days)     Procedure Component Value Units Date/Time    XR Chest 1 View [[id number]] Collected: 07/17/21 2018     Updated: 07/17/21 2020    Narrative:      CR Chest 1 Vw    INDICATION:   Hypertension. Preop.     COMPARISON:    None available.    FINDINGS:  Single portable AP view(s) of the chest.  Cardiomegaly with mild pulmonary vascular congestion. No interstitial or alveolar edema. Post median sternotomy and apparent CABG. No effusions or pneumothorax.      Impression:      Cardiomegaly with pulmonary vascular congestion in this patient post CABG. This could represent mild fluid overload or early CHF. No effusions or edema.    Signer Name: MARIANNA Montgomery MD   Signed:  7/17/2021 8:18 PM   Workstation Name: [facility]    [facility]    XR Hip With or Without Pelvis 2 - 3 View Right [[id number]] Collected: 07/17/21 1945     Updated: 07/17/21 1947    Narrative:      CR Hip Uni Comp Min 2 Vws RT    INDICATION:   Right hip pain after a fall.    COMPARISON:   None.    FINDINGS:  AP and frog-leg lateral view(s) of the right hip.  Minimally displaced intertrochanteric fracture proximal right femur. No significant angulation. No significant hip arthropathy. Visualized pelvis and left hip unremarkable. No bone erosion or  destruction.        Impression:      Minimally displaced right proximal intertrochanteric femur fracture.    Signer Name: MARIANNA Montgomery MD   Signed: 7/17/2021 7:45 PM   Workstation Name: [facility]    [facility]        Assessment and Plan:   1. Right hip intertrochanteric femur fracture, closed     Plan operative fixation today at approximately 1 pm by Dr. Ponce for right hip right hip intertrochanteric nailing with intermedullary hip screw. Patient has been NPO since midnight. Type and screen ordered. See orders for additional details.     Patient was verbally informed on the nature of the procedure as well as risks and benefits to include but not limited to infection, hemorrhage, pneumothorax, neurovascular injury, anesthesia complications, etc. Patient feels that they have a good understanding of the treatment plan and would like to proceed with surgical intervention.     Thank you for the consult.      LAILA Ortez  07/18/21  08:20 EDT    Electronically signed by Oracio Ponce DO at 07/18/21 1215

## 2024-01-12 NOTE — BRIEF OP NOTE
[facility]  Brief Operative Note     SUMMARY     Surgery Date: 1/12/2024     Surgeon: Dayami Escobar MD    Assist: Mitali Jimenez PA-C    Pre-op Diagnosis:  Malignant neoplasm of overlapping sites of left breast in female, estrogen receptor positive [C50.812, Z17.0]    Post-op Diagnosis:  Post-Op Diagnosis Codes:     * Malignant neoplasm of overlapping sites of left breast in female, estrogen receptor positive [C50.812, Z17.0]    Procedure(s) (LRB):  MASTECTOMY, SIMPLE - LEFT (Left)  BIOPSY, LYMPH NODE, SENTINEL - LEFT MagTrace Injected in office SentiMag needed No NUC MED (Left)  LYMPHADENECTOMY, AXILLARY - LEFT (Left)    Anesthesia: General    Findings/Key Components: Removal of the left breast with biopsy-proven malignancy and left axillary sentinel lymph nodes for further pathology evaluation.    Estimated Blood Loss: 15 ml         Specimens:   Specimen (24h ago, onward)       Start     Ordered    01/12/24 1158  Specimen to Pathology  RELEASE UPON ORDERING        References:    Click here for ordering Quick Tip   Question:  Release to patient  Answer:  Immediate    01/12/24 1158                  ID Type Source Tests Collected by Time Destination   A : LEFT SIMPLE MASTECTOMY; STITCH: SHORT SUPERIOR, LONG LATERAL, DOUBLE AXILLARY TAIL Tissue Breast, Left SPECIMEN TO PATHOLOGY Dayami Escobar MD 1/12/2024 1113    B : LEFT AXILLARY SENTINEL LYMPH NODE #1:HOT, BROWN, @9999 Tissue Lymph Node SPECIMEN TO PATHOLOGY Dayami Escobar MD 1/12/2024 1144    C : LEFT AXILLARY SENTINEL LYMPH NODE #2: HOT, BROWN, @9999 Tissue Lymph Node SPECIMEN TO PATHOLOGY Dayami Escobar MD 1/12/2024 1145    D : LEFT AXILLARY SENTINEL LYMPH NODE #3: HOT, BROWN, @2784 Tissue Lymph Node SPECIMEN TO PATHOLOGY Dayami Escobar MD 1/12/2024 1146    E : LEFT AXILLARY SENTINEL LYMPH NODE #4: PALPABLE Tissue Lymph Node SPECIMEN TO PATHOLOGY Dayami Escobar MD 1/12/2024 1150        Discharge Note    SUMMARY      Admit Date: 1/12/2024    Discharge Date and Time:  01/12/2024 12:16 PM    Hospital Course (synopsis of major diagnoses, care, treatment, and services provided during the course of the hospital stay): She status post left simple mastectomy with left sentinel lymph node biopsy.      Final Diagnosis: Post-Op Diagnosis Codes:     * Malignant neoplasm of overlapping sites of left breast in female, estrogen receptor positive [C50.812, Z17.0]    Disposition: Home or Self Care    Follow Up/Patient Instructions:    Follow-up Information       Dayami Escobar MD Follow up.    Specialties: Breast Surgery, General Surgery  Why: 1/23/2024 10:00 AM  Contact information:  [address] [address]  [phone]                             Medications:  Reconciled Home Medications:      Medication List        START taking these medications      traMADoL 50 mg tablet  Commonly known as: ULTRAM  Take 1 tablet (50 mg total) by mouth every 6 (six) hours as needed for Pain.            Discharge Procedure Orders   Diet general     Lifting restrictions     Remove dressing in 48 hours     Call MD for:  temperature >100.4     Call MD for:  persistent nausea and vomiting     Call MD for:  severe uncontrolled pain     Call MD for:  difficulty breathing, headache or visual disturbances     Call MD for:  redness, tenderness, or signs of infection (pain, swelling, redness, odor or green/yellow discharge around incision site)     Call MD for:  hives     Call MD for:  persistent dizziness or light-headedness      Follow-up Information       Dayami Escobar MD Follow up.    Specialties: Breast Surgery, General Surgery  Why: 1/23/2024 10:00 AM  Contact information:  [address] [facility] [address]  [phone]

## 2024-01-12 NOTE — OP NOTE
DATE OF PROCEDURE: 1/12/2024    SURGEON: Dayami Escobar M.D.    ASSISTANT:  Mitali Jimenez PA-C    PREOPERATIVE DIAGNOSIS: Malignant neoplasm of overlapping sites of left breast in female, estrogen receptor positive [C50.812, Z17.0]     POSTOPERATIVE DIAGNOSIS: Post-Op Diagnosis Codes:     * Malignant neoplasm of overlapping sites of left breast in female, estrogen receptor positive [C50.812, Z17.0]     ANESTHESIA: General Anesthesiologist: Carla Shearer MD  CRNA: Geoff Martinez II, CRNA     PROCEDURES PERFORMED:   1. Left breast simple mastectomy  2. Identification of left axillary sentinel lymph node  3. Left axillary deep sentinel lymph node biopsy   4. Injection of left breast with Superparamagnetic iron oxide (MagTrace) in the office for sentinel lymph node identification    PROCEDURE IN DETAIL:   Karo Lakhani is a 83 y.o. female brought to the operating room for definitive surgical management of left breast cancer. The patient has elected to undergo left simple mastectomy with sentinel lymph node biopsy for daniel assessment. The patient was informed of the possible risks and complications of the procedure, including but not limited to anesthetic risks, bleeding, infection, and need for additional surgery.  The patient concurred with the proposed plan, and has given informed consent.  The site of surgery was properly noted/marked in the preoperative holding area.    The patient's left breast was injected with Superparamagnetic iron oxide (MagTrace) in office to facilitate sentinel lymph node identification. The patient was then brought to the operating room and placed in the supine position with both upper extremities extended.  General anesthesia was administered. Perioperative antibiotics were administered and a time out was performed confirming the patient, site, and procedure. The left chest and axilla was then prepped and draped in the usual sterile fashion.    The Left Mastectomy  The  left simple mastectomy was performed first. A large elliptical skin incision was made to right breast tissue that included the nipple-areolar complex. Flaps were raised in the avascular plane between subcutaneous tissue and breast tissue was used for to raise flaps from the clavicle superiorly, the sternum medially, the anterior rectus sheath inferiorly, and the lateral border of the pectoralis major muscle laterally. Hemostasis was maintained in the flaps. Next, the breast tissue and underlying pectoralis fascia were excised from the pectoralis major muscle, progressing from medial to lateral. At the lateral border of the pectoralis major muscle, the breast tissue was sung laterally and a lateral pedicle identified where breast tissue gave way to fat of axilla. The lateral pedicle was incised, removed and the specimen was marked. Superiorly was a short suture and laterally was a long suture. The resulting mastectomy specimen was marked using a short stitch superiorly and a long stitch laterally. The breast was sent to pathology for permanent evaluation. The operative field was irrigated with normal saline and all bleeding points were secured with Bovie electrocautery.     Left [place] Lymph Node Biopsy was then performed  Using the hand-held SentiMag probe, activity was noted and localized in the left axilla. A small transverse inferior axillary incision over the area of activity. Dissection was carried down through the clavipectoral fascia using electrocautery. The lateral border of the pectoralis of the left axilla was excised and the axilla was entered. Using a hand-held SentiMag probe the axilla was assessed for a sentinel lymph node. A Brown afferent lymphatic channel coming from the breast upper outer quadrant to level 1 axillary lymph tissue. [place] lymph nodes were identified as follows:  Left axillary sentinel lymph node #1, Superparamagnetic iron oxide (MagTrace) identified and count: 9999  Left  axillary sentinel lymph node #2, Superparamagnetic iron oxide (MagTrace) identified and count: 9999  Left axillary sentinel lymph node #3, Superparamagnetic iron oxide (MagTrace) identified and count: 2784  Left axillary sentinel lymph node #4, Palpable    The substances used for sentinel lymph node biopsy in this patient: Superparamagnetic iron oxide (MagTrace)  Number of lymph nodes removed with MagTrace: 3  Number of lymph nodes removed with Isosulfan (or Methylene) blue dye and Radiotracer: 0  Number of lymph nodes removed with only Radiotracer signal: 0  Number of lymph nodes removed with only Isosulfan (or Methylene) blue dye: 0  Number of lymph nodes removed with only palpable: 1    The sentinel lymph nodes were not sent to Pathologist for intra-operative Frozen Section and permanent pathology review will be performed.    No further dissection was undertaken.      The cavity was irrigated and hemostasis was obtained. Via a remote separate incision, a 15-Yoruba round Bernard-Page (LIVE) was placed and secured with a 3-0 Nylon stuart sandal suture.    The subdermal layer was closed with 3-0 Monocryl and 4-0 subcuticular running skin closures and dermabond was applied followed by sterile dressings.        Significant Surgical Tasks Conducted by the Assistant(s), if Applicable: The skilled assistance of the Physician Assistant, Mitali Jimenez PA-C, was necessary for the successful completion of this case. She was essential for proper positioning of the patient, manipulation of instruments, proper exposure, manipulation of tissue, and wound closure.       ESTIMATED BLOOD LOSS: 15 ml    Implants: * No implants in log *    Specimens:   Specimen (24h ago, onward)       Start     Ordered    01/12/24 1154  Specimen to Pathology  RELEASE UPON ORDERING        References:    Click here for ordering Quick Tip   Question:  Release to patient  Answer:  Immediate    01/12/24 1151                   ID Type Source Tests  Collected by Time Destination   A : LEFT SIMPLE MASTECTOMY; STITCH: SHORT SUPERIOR, LONG LATERAL, DOUBLE AXILLARY TAIL Tissue Breast, Left SPECIMEN TO PATHOLOGY Dayami Escobar MD 1/12/2024 1113    B : LEFT AXILLARY SENTINEL LYMPH NODE #1:HOT, BROWN, @9999 Tissue Lymph Node SPECIMEN TO PATHOLOGY Dayami Escobar MD 1/12/2024 1144    C : LEFT AXILLARY SENTINEL LYMPH NODE #2: HOT, BROWN, @9999 Tissue Lymph Node SPECIMEN TO PATHOLOGY Dayami Escobar MD 1/12/2024 1145    D : LEFT AXILLARY SENTINEL LYMPH NODE #3: HOT, BROWN, @2784 Tissue Lymph Node SPECIMEN TO PATHOLOGY Dayami Escobar MD 1/12/2024 1146    E : LEFT AXILLARY SENTINEL LYMPH NODE #4: PALPABLE Tissue Lymph Node SPECIMEN TO PATHOLOGY Dayami Escobar MD 1/12/2024 1150                 Condition: Good    Disposition: PACU - hemodynamically stable.    Attestation: I performed the procedure.    [place] Node Biopsy for Breast Cancer - Left  Operation performed with curative intent Yes   Tracer(s) used to identify sentinel nodes in the upfront surgery (non-neoadjuvant) setting Superparamagnetic iron oxide   Tracer(s) used to identify sentinel nodes in the neoadjuvant setting N/A   All nodes (colored or non-colored) present at the end of a dye-filled lymphatic channel were removed Yes   All significantly radioactive nodes were removed Yes   All palpably suspicious nodes were removed Yes   Biopsy-proven positive nodes marked with clips prior to chemotherapy were identified and removed N/A

## 2024-01-12 NOTE — ANESTHESIA POSTPROCEDURE EVALUATION
Anesthesia Post Evaluation    Patient: Karo Lakhani    Procedure(s) Performed: Procedure(s) (LRB):  MASTECTOMY, SIMPLE - LEFT (Left)  BIOPSY, LYMPH NODE, SENTINEL - LEFT MagTrace Injected in office SentiMag needed No NUC MED (Left)  LYMPHADENECTOMY, AXILLARY - LEFT (Left)    Final Anesthesia Type: general      Patient location during evaluation: PACU  Patient participation: Yes- Able to Participate  Level of consciousness: awake and alert  Post-procedure vital signs: reviewed and stable  Pain management: adequate  Airway patency: patent    PONV status at discharge: No PONV  Anesthetic complications: no      Cardiovascular status: hemodynamically stable  Respiratory status: unassisted  Hydration status: euvolemic  Follow-up not needed.              Vitals Value Taken Time   /75 01/12/24 1251   Temp 36.3 °C (97.3 °F) 01/12/24 1238   Pulse 71 01/12/24 1258   Resp 15 01/12/24 1258   SpO2 100 % 01/12/24 1258   Vitals shown include unvalidated device data.      No case tracking events are documented in the log.      Pain/Brijesh Score: Pain Rating Prior to Med Admin: 0 (1/12/2024  9:03 AM)  Brijesh Score: 8 (1/12/2024 12:38 PM)

## 2024-01-12 NOTE — DISCHARGE INSTRUCTIONS
BREAST SURGERY POST-OP INSTRUCTIONS  DR. COLIN JORGE PA-C     How do I care for my incisions?  You and your caregiver should look at your incision daily. Call your doctor if you see any redness or drainage from your incision.  You will be given a support bra, wear this for 24 hours a day (unless showering or sponge bathing) for comfort and to help decrease amount of swelling. If the bra fits too loose or too tight you may go to your local store a purchase a front opening sports bra that fits snug.   Your incision will be closed with sutures (stitches) under your skin. These sutures dissolve on their own, so they do not need to be removed.  · If you go home with Steri-StripsTM on your incision, they will loosen and fall off by themselves. If they havent fallen off within 14 days, you may remove them.  · If you go home with glue over your sutures (stitches), it will also loosen and peel off, similarly to the Steri-Strips.  ** If you have had a Mastectomy and/or Reconstruction and/or Axillary Lymph Node Dissection:  You may have 1-2 Bernard-Page Drains in place. Please refer to the additional instructions discussing care of your drains.     Is it normal to feel new sensations?  As you are healing, you may feel a several different sensations in your breast. Tenderness, numbness, and twinges are common examples. These sensations usually come and go, and will lessen over time, usually within the first few months after surgery. As you continue to heal, you may feel scar tissue along your incision site. It will feel hard. This is common and will soften over the next several months.     Can I shower?  You can shower 48 hours after your surgery. Taking a warm shower is relaxing and can help decrease discomfort. Use soap when you shower and gently wash your incision. Pat the areas dry with a towel after showering, and leave your incision uncovered, unless you have drainage from your incision. If you  have drainage, call your doctors office.  Do not take tub baths, swim, or use hot tubs or saunas until you discuss it with your doctor at the first appointment after your surgery.     Will I have pain when I am home?  The length of time each person has pain or discomfort varies. You will be given a prescription for pain medication before you go home. Follow the guidelines below to manage your pain.  · Take your medication as directed and as needed.  · Call your doctor if the pain medication prescribed for you doesnt relieve your pain.  · Do not drive or drink alcohol while you are taking prescription pain medication.  · As your incision heals, you will have less pain and need less pain medication. A mild pain reliever such as acetaminophen (Tylenol) or ibuprofen (Advil) will relieve aches and discomfort. However, large quantities of acetaminophen may be harmful to your liver. Do not take more acetaminophen than the amount directed on the bottle or as instructed by your doctor or nurse.  · Pain medication should help you as you resume your normal activities. Pain medication is most effective 30 to 45 minutes after taking it.  · Keep track of when you take your pain medication. Taking it when your pain first begins is more effective than waiting for the pain to get worse.  Pain medication may cause constipation (having fewer bowel movements than what is normal for you).     How can I prevent constipation?  · Go to the bathroom at the same time every day. Your body will get used to going at that time.  · If you feel the urge to go, do not put it off. Try to use the bathroom 5 to 15 minutes after meals.  · After breakfast is a good time to move your bowels because the reflexes in your colon are strongest then.  · Exercise if you can; walking is an excellent form of exercise.  · Drink 8 (8-ounce) glasses (2 liters) of liquids daily, if you can. Drink water, juices, soups, ice cream shakes, and other drinks that do not  have caffeine. Beverages with caffeine, such as coffee and soda, pull fluid out of the body.  · Slowly increase the fiber in your diet to 25 to 35 grams per day. Fruits, vegetables, whole grains, and cereals contain fiber. If you have an ostomy or have had recent bowel surgery, check with your doctor or nurse before making any changes in your diet.  · Both over-the-counter and prescription medications are available to treat constipation. Start with 1 of the following over-the-counter medications first:  o Docusate sodium (Colace®) 100 mg. Take __1___ capsules __1___ time a day. This is a stool softener that causes few side effects. Do not take it with mineral oil.  o Polyethylene glycol (MiraLAX®) 17 grams daily.  o Senna (Senokot®) 2 tablets at bedtime. This is a stimulant laxative, which can cause cramping.  · If you havent had a bowel movement in 2 days, call your doctor or nurse.     Will I be able to eat?  You can resume eating when you go home after surgery. Eating a balanced diet high in protein will help you heal after surgery. Your diet should include a healthy protein source at each meal, as well as fruits, vegetables, and whole grains. If you have questions about your diet, ask to see a dietitian.     When is it safe for me to drive and what activities can I perform?  You may resume driving after surgery as long as you are not taking prescription pain medication that may make you drowsy, and you have your full range of motion.  You should not lift anything heavier than 10-15 lbs the first week. After this time, you may gradually increase the amount of weight. You want to take it easy the first 2 weeks, no strenuous or repetitive movements such as vacuuming or scrubbing. Walking is okay. Ask the doctor if you have questions.     How long until I have the pathology results?  The pathology report usually takes to 7 to 10 business days.     When is my first appointment after my surgery?  You should be given  or schedule a follow-up appointment 1 to 2 weeks after your surgery.     How can I cope with my feelings?   After surgery for a serious illness, you may have new and upsetting feelings. Many patients say they felt sad, worried, nervous, irritable, or angry at one time or another. You may find that you cannot control some of these feelings. If this happens, its a good idea to seek emotional support.  The first step in coping is to talk about how you feel. Family and friends can help. Your nurse, doctor, and  can reassure, support, and guide you. It is always a good idea to let these professionals know how you, your family, and your friends are feeling emotionally. Many resources are available to patients and their families. Whether you are in the hospital or at home, we are here to help you and your family and friends handle the emotional aspects of your illness.     What if I have other questions?  If you have any questions or concerns, please talk with your doctor or nurse. You can reach them Monday through Thursday from 9:00 AM to 5:00 PM and Friday from 9:00 AM to 12:00 PM at [phone].  After 5:00 PM M-Th or 12:00 PM Fri, during the weekend, and on holidays, please call [phone] and ask for the doctor on call.     PLEASE CALL YOUR DOCTOR OR NURSE IF YOU HAVE:  · A temperature of 101° F (38.3° C) or higher  · Shortness of breath  · Warmer than normal skin around your incision  · Increased discomfort in the area  · Increased redness around your incision  · New or increased swelling around your incision  · Discharge from your incision    Patient Education     Bernard-Page Drain      What will the results be?   This drain gets rid of extra fluid from your cut site. This will help it to get better faster.  What happens after the procedure?   You may be sore where the drain was put in. Your doctor will give you drugs for the pain.  You may need to stay in the hospital until you are well enough to  go home. Your doctor will watch to see how much fluid is in the bulb each day.  The nurses will empty the drain when it gets about half full or at certain times during the day and measure the amount of fluid emptied.  What care is needed at home?   Wash your hands with soap and water every time before and after you empty the drain. Do not change your dressing around the LIVE drain.  Do not put any lotions, creams, or oils around the LIVE drain site.  Empty the drain. Remove the stopper at the end. Pour out the drainage. Your doctor may want you to measure how much drainage is in the bulb. Then, squeeze the bulb to get rid of air and put the stopper back in place.  Care for the tube if there is a clot in it. Squeeze the tube over the clot. You can also squeeze the tube from your skin to the bulb and then let it go. This should open the tube.   Measure the amount of fluid in the drain ball after you empty it each day up to 3 times a day and write it down on a chart.  Look for signs of infection. Your doctor will want to know if you have a fever of 100.4°F (38°C) or higher, chills, if you get very red and sore around the drain, or if the fluid in the drain turns cloudy or smells. Your doctor will want to know if the skin around the drain has any fluid or pus coming out of it.  Sleep on the side opposite to the LIVE drain. This prevents any kinking of the tubing or pulling out the suction bulb.  Avoid bumping the drain.  You can hold the drain up with a safety pin while you are moving around.   Ask your doctor about your activity level while you have your drain in place.  What follow-up care is needed?   Be sure to keep your follow up visit.  Your doctor will tell you when the drain may be removed.  What problems could happen?   Fluid leaking from the cut site  Bleeding  Infection  Clot in the tube  Tube and drain falls out  Cut area opens up  Drain stops working  When do I need to call the doctor?   Signs of infection at the  drain site. These include swelling, redness, warmth around the wound, too much pain when touched, yellowish or greenish or bloody discharge, foul smell coming from the cut site.  Drain becomes loose, comes apart, abruptly stops draining, or falls out

## 2024-01-12 NOTE — PROGRESS NOTES
Chris drain with compressed bulb intact noted, surrounding area soft and warm to the touch, color WDL.

## 2024-01-13 VITALS
SYSTOLIC BLOOD PRESSURE: 111 MMHG | RESPIRATION RATE: 16 BRPM | TEMPERATURE: 97 F | WEIGHT: 115.94 LBS | OXYGEN SATURATION: 96 % | HEART RATE: 74 BPM | BODY MASS INDEX: 19.79 KG/M2 | HEIGHT: 64 IN | DIASTOLIC BLOOD PRESSURE: 69 MMHG

## 2024-01-18 ENCOUNTER — TELEPHONE (OUTPATIENT)
Dept: SURGERY | Facility: CLINIC | Age: 84
End: 2024-01-18
Payer: MEDICARE

## 2024-01-18 NOTE — TELEPHONE ENCOUNTER
Patient's sister given pathology results.      ----- Message from Dayami Escobar MD sent at 1/18/2024 10:59 AM CST -----  Please call the patient and inform pathology results revealed the cancer was completely removed, but cancer was found in 1 out of 6 lymph nodes. Further discussion will be had at their post-op/follow-up appointment.

## 2024-01-19 NOTE — PROGRESS NOTES
[facility] Breast Surg  Breast Surgical Oncology  Follow-Up Patient Office Visit       Referring Provider: No ref. provider found  PCP: Arline Greenwood NP   Medical Oncologist: No care team member to display   Radiation Oncologist: No care team member to display   Other Care Providers:     Chief Complaint:   Chief Complaint   Patient presents with    Post-op Evaluation     Patient is here post-op eval, pt report tenderness to touch, no swelling, no pain        Subjective:   Treatment History:  2024 :Left Mastectomy with Left SLNB      Interval History:  2024 - Karo Lakhani presents today for her post op appointment. She did well post-op with minimal pain.    HPI:  Karo Lakhani is a 83 y.o. female who presents on 2023 for evaluation of newly diagnosed left  breast cancer.     A detailed patient history was obtained and reviewed. She currently denies any breast issues including rashes, redness, pain, swelling, nipple discharge, or new lumps/masses.     MG breast density: Category B (scattered areas of fibroglandular tissue)      Imagin2023 BL DG MG/ L US BREAST LIMITED at [facility]- which revealed on L MG in the area of palpable concern at the 12 o'clock anterior to middle depth, there is a spiculated mass with associated heterogeneous calcifications which spans on the order of 5 cm mammographically and extends towards the nipple which appears mildly retracted. On R MG, there are no suspicious findings. On L US at 12 o'clock 4 cm FN,  there is an irregular hypoechoic mass with associated calcifications, spanning approximately 4.7 x 1.7 x 1.8 cm.  This demonstrates linear hypoechoic component extending toward the nipple as well as a presumed satellite nodule measuring 1.0 x 0.7 by 0.8 cm at the 11 o'clock  2 cm FN.  No pathologically enlarged lymph nodes visualized in the left axilla.  BIRADS-5 highly suspicious; biopsy recommended.         Pathology:  2023  Ultrasound-guided Core Needle Biopsy Left Breast 12 o'clock 4 cm FN-         - Invasive ductal carcinoma, grade 2 (measuring 1.5 cm)         - Focal ductal carcinoma in situ, grade 2 (measuring 1 mm)           ER 95%           FL 94%           HER 2 mj 2+/ FISH Negative           Ki67 27%    2. 2024 Left Mastectomy with Left [place] Lymph Node Biopsy which revealed invasive ductal carcinoma, grade 2 (measuring 3.8 cm) with focal microcalcifications.  Ductal carcinoma in situ, not extensive, grade 2 (measuring 4 cm)with solid to slightly cribriform architecture, focal comedonecrosis and focal microcalcifications. Multifocal lymphatic tumor invasion identified.  All margins clear.  One out of five lymph nodes positive for micrometastatic tumor.      OB/GYN History:  Age at Menarche Onset: 14  Menopausal Status: postmenopausal, LMP: No LMP recorded. Patient is postmenopausal.  Hysterectomy/Oophorectomy: menopause, at age 56  Hormonal birth control (duration): No none.   Pregnancy History:   Age at first live birth: 1  Hormone Replacement Therapy: No, none  Patient did not breast feed.  Patient denies nipple discharge.   Patient denies to previous breast biopsy.   Patient denies to a personal history of breast cancer.     Other Relevant History:  Prior thoracic RT: none  Genetic testing: No  Ashkenazi Christian descent: No     Family History:  Family History   Problem Relation Age of Onset    Dementia Mother 80 - 89    Heart disease Mother     Colon cancer Father 91    Cancer Father         Colon Cancer    Throat cancer Maternal Grandmother     Skin cancer Maternal Grandmother     Cancer Sister         Carcinoma of the liver        Past History:  Past Medical History:   Diagnosis Date    Cancer 10/23        Past Surgical History:   Procedure Laterality Date    AXILLARY NODE DISSECTION Left 2024    Procedure: LYMPHADENECTOMY, AXILLARY - LEFT;  Surgeon: Dayami Escobar MD;  Location: [facility];  Service:  General;  Laterality: Left;    BREAST SURGERY  01/12/2024    COLONOSCOPY      HIP FRACTURE SURGERY Left 10/2017    SENTINEL LYMPH NODE BIOPSY Left 01/12/2024    Procedure: BIOPSY, LYMPH NODE, SENTINEL - LEFT MagTrace Injected in office SentiMag needed No NUC MED;  Surgeon: Dayami Escobar MD;  Location: [facility] OR;  Service: General;  Laterality: Left;  MagTrace Injected in office  SentiMag needed  No NUC MED    SIMPLE MASTECTOMY Left 01/12/2024    Procedure: MASTECTOMY, SIMPLE - LEFT;  Surgeon: Dayami Escobar MD;  Location: [facility] OR;  Service: General;  Laterality: Left;        Social History     Socioeconomic History    Marital status: Single   Tobacco Use    Smoking status: Never     Passive exposure: Never    Smokeless tobacco: Never   Substance and Sexual Activity    Alcohol use: Never    Drug use: Never    Sexual activity: Never        Body mass index is 19.98 kg/m².     Allergy/Medications:   Review of patient's allergies indicates:  No Known Allergies     No current outpatient medications on file.  No current facility-administered medications for this visit.    Facility-Administered Medications Ordered in Other Visits:     electrolyte-A infusion, , Intravenous, Continuous, Carla Shearer MD    HYDROcodone-acetaminophen 5-325 mg per tablet 1 tablet, 1 tablet, Oral, Q3H PRN, Carla Shearer MD    HYDROmorphone (PF) injection 0.4 mg, 0.4 mg, Intravenous, Q5 Min PRN, Carla Shearer MD, 0.2 mg at 01/12/24 1322    lactated ringers infusion, , Intravenous, Continuous, Carla Shearer MD, Last Rate: 10 mL/hr at 01/12/24 1350, New Bag at 01/12/24 1350    LIDOcaine (PF) 10 mg/ml (1%) injection 10 mg, 1 mL, Intradermal, Once, Carla Shearer MD    midazolam (VERSED) 1 mg/mL injection 2 mg, 2 mg, Intravenous, Once PRN, Carla Shearer MD    ondansetron injection 4 mg, 4 mg, Intravenous, Daily PRN, Carla Shearer MD    prochlorperazine injection Soln 5 mg, 5 mg, Intravenous, Q30 Min PRN,  "Carla Shearer MD       Review of Systems:  Review of Systems   All other systems reviewed and are negative.          Objective:     Vitals:  Blood pressure (!) 144/69, pulse (!) 56, temperature 97.8 °F (36.6 °C), temperature source Oral, resp. rate 18, height 5' 4" (1.626 m), weight 52.8 kg (116 lb 6.4 oz), SpO2 100 %.      Physical Exam:  General: The patient is awake, alert and oriented times three. The patient is well nourished and in no acute distress.   Musculoskeletal: The patient has a normal range of motion of her bilateral upper extremities.   Breast:  Left: Examination of left mastectomy fails to reveal any dominant masses or areas of significant focal nodularity. The nipple areola complex is surgically absent. There is no skin dimpling with movement of the pectoralis. There are no significant skin changes overlying the mastectomy. Mastectomy incision is healing well. LIVE drain with serosanguineous output.  Integumentary: no rashes or skin lesions present  Neurologic: cranial nerves intact, no signs of peripheral neurological deficit, motor/sensory function intact      Assessment and Plan:     Encounter Diagnoses   Name Primary?    Malignant neoplasm of overlapping sites of left breast in female, estrogen receptor positive Yes    Status post left mastectomy     Status post lymph node biopsy     Encounter for screening mammogram for breast cancer         Her pathology results were discussed in detail.      Her next steps and follow-up were discussed as well.       Plan:     Problem List Items Addressed This Visit          Oncology    Malignant neoplasm of overlapping sites of left breast in female, estrogen receptor positive - Primary    Current Assessment & Plan     Medical Oncology Referral - re: adjuvant therapy, ([facility]) - Dr.Lejeune  Recommend clinical follow-up in 3 months  Recommend Right SC MG - due Nov 2024  Prescription for bras and prosthesis  Removal of LIVE Drain today         Relevant " Orders    Ambulatory referral/consult to Hematology / Oncology    Ambulatory referral/consult to Radiation Oncology     Other Visit Diagnoses       Status post left mastectomy        Relevant Orders    Ambulatory referral/consult to Hematology / Oncology    Ambulatory referral/consult to Radiation Oncology    Status post lymph node biopsy        Relevant Orders    Ambulatory referral/consult to Hematology / Oncology    Ambulatory referral/consult to Radiation Oncology    Encounter for screening mammogram for breast cancer        Relevant Orders    Mammo Digital Screening Right with [facility]                All of questions were answered    Dayami Escobar MD  Breast Surgical Oncology     OFFICE VISIT CODING:  Post-Op Visit

## 2024-01-23 ENCOUNTER — OFFICE VISIT (OUTPATIENT)
Dept: SURGERY | Facility: CLINIC | Age: 84
End: 2024-01-23
Payer: MEDICARE

## 2024-01-23 VITALS
OXYGEN SATURATION: 100 % | BODY MASS INDEX: 19.87 KG/M2 | HEART RATE: 56 BPM | SYSTOLIC BLOOD PRESSURE: 144 MMHG | RESPIRATION RATE: 18 BRPM | HEIGHT: 64 IN | TEMPERATURE: 98 F | DIASTOLIC BLOOD PRESSURE: 69 MMHG | WEIGHT: 116.38 LBS

## 2024-01-23 DIAGNOSIS — C50.812 MALIGNANT NEOPLASM OF OVERLAPPING SITES OF LEFT BREAST IN FEMALE, ESTROGEN RECEPTOR POSITIVE: Primary | ICD-10-CM

## 2024-01-23 DIAGNOSIS — Z98.890 STATUS POST LYMPH NODE BIOPSY: ICD-10-CM

## 2024-01-23 DIAGNOSIS — Z90.12 STATUS POST LEFT MASTECTOMY: ICD-10-CM

## 2024-01-23 DIAGNOSIS — Z17.0 MALIGNANT NEOPLASM OF OVERLAPPING SITES OF LEFT BREAST IN FEMALE, ESTROGEN RECEPTOR POSITIVE: Primary | ICD-10-CM

## 2024-01-23 DIAGNOSIS — Z12.31 ENCOUNTER FOR SCREENING MAMMOGRAM FOR BREAST CANCER: ICD-10-CM

## 2024-01-23 PROCEDURE — 99999 PR PBB SHADOW E&M-EST. PATIENT-LVL V: CPT | Mod: PBBFAC,,, | Performed by: SURGERY

## 2024-01-23 PROCEDURE — 99215 OFFICE O/P EST HI 40 MIN: CPT | Mod: PBBFAC | Performed by: SURGERY

## 2024-01-23 PROCEDURE — 99024 POSTOP FOLLOW-UP VISIT: CPT | Mod: POP,,, | Performed by: SURGERY

## 2024-01-23 NOTE — ASSESSMENT & PLAN NOTE
Medical Oncology Referral - re: adjuvant therapy, ([facility]) - Dr.Lejeune  Recommend clinical follow-up in 3 months  Recommend Right SC MG - due Nov 2024  Prescription for bras and prosthesis  Removal of LIVE Drain today

## 2024-01-24 ENCOUNTER — TUMOR BOARD CONFERENCE (OUTPATIENT)
Dept: SURGERY | Facility: CLINIC | Age: 84
End: 2024-01-24
Payer: MEDICARE

## 2024-01-24 NOTE — PROGRESS NOTES
A multidisciplinary Tumor Board meeting was held to consensually decide upon treatment recommendations for this breast cancer patient. The members present at the meeting included physicians representing breast radiology, surgical oncology, medical oncology, and radiation oncology.    Date of meetin2024    Patient Name: Karo Lakhani  : 1940  Age: 83 y.o.  Sex: female      The patient's clinical history, staging, radiology, pathology, labs/biomarkers, and past treatments were discussed in detail. The following recommendations were made regarding further treatment:    Oncology History   Malignant neoplasm of overlapping sites of left breast in female, estrogen receptor positive   2023 Initial Diagnosis    Malignant neoplasm of overlapping sites of left breast in female, estrogen receptor positive     2023 Cancer Staged    Staging form: Breast, AJCC 8th Edition  - Clinical stage from 2023: Stage IB (cT2, cN0, cM0, G2, ER+, NH+, HER2-)     2024 Cancer Staged    Staging form: Breast, AJCC 8th Edition  - Pathologic stage from 2024: Stage IB (pT2, pN1mi(sn), cM0, G2, ER+, NH+, HER2-)           Recommendations:  Surgery: No further surgical recommendations    Radiation Oncology: Consider XRT.     Medical Oncology: Oncotype DX recommended    Notes/Comments: none

## 2024-02-02 LAB — PSYCHE PATHOLOGY RESULT: NORMAL

## 2024-02-03 ENCOUNTER — EXTERNAL HOME HEALTH (OUTPATIENT)
Dept: HOME HEALTH SERVICES | Facility: HOSPITAL | Age: 84
End: 2024-02-03
Payer: MEDICARE

## 2024-02-07 NOTE — PROGRESS NOTES
Subjective:       Patient ID: Karo Lakhani is a 83 y.o. female.    Chief Complaint: New Patient    Diagnosis: Left Breast Cancer - Hormone Positive    Current Treatment: None    Treatment History:   Left Breast Lumpectomy with SLNBx - Dr. Escobar - 1/12/24    HPI:  84yo F presents in February '24 for evaluation of hormone positive left breast cancer. She felt a mass in her left breast in Fall '23 and underwent diagnostic imagine in November '23 which revealed at the 12 o'clock position 4 CMFN, an irregular hypoechoic mass with associated calcifications, spanning approximately 4.7 x 1.7 x 1.8 cm. There was also noted to be a linear hypoechoic component extending toward the nipple as well as a presumed satellite nodule measuring 1.0 x 0.7 by 0.8 cm at the 11 o'clock  2 CMFN.  No pathologically enlarged lymph nodes visualized in the left axilla. Biopsy of the mass revealed Invasive ductal Carcinoma, G2, ER 95%, [place] 94%, Her2 2+, Fish Negative, Ki67 27%. She underwent left breast lumpectomy with Dr. Escobar on 1/12/24 with final pathology revealing a 3.8cm G2 IDC with approx 4cm of G2 DCIS as well. Of the 6 lymph nodes sampled, 1 returned positive for micrometastatic disease measuring 0.4mm. She had an oncotype done with a score of 15. She presents to medical oncology for further evaluation.     She currently has no complaints today. She is healing well from surgery.     I discussed her diagnosis, stage, prognosis, reviewed her oncotype score, and referenced NCCN guidelines when discussing her next steps in therapy. She is agreeable to proceed.       Past Medical History:   Diagnosis Date    Cancer 10/23      Past Surgical History:   Procedure Laterality Date    AXILLARY NODE DISSECTION Left 01/12/2024    Procedure: LYMPHADENECTOMY, AXILLARY - LEFT;  Surgeon: Dayami Escobar MD;  Location: [facility];  Service: General;  Laterality: Left;    BREAST SURGERY  01/12/2024    COLONOSCOPY      HIP FRACTURE SURGERY Left  10/2017    SENTINEL LYMPH NODE BIOPSY Left 01/12/2024    Procedure: BIOPSY, LYMPH NODE, SENTINEL - LEFT MagTrace Injected in office SentiMag needed No NUC MED;  Surgeon: Dayami Escobar MD;  Location: [facility] OR;  Service: General;  Laterality: Left;  MagTrace Injected in office  SentiMag needed  No NUC MED    SIMPLE MASTECTOMY Left 01/12/2024    Procedure: MASTECTOMY, SIMPLE - LEFT;  Surgeon: Dayami Escobar MD;  Location: [facility] OR;  Service: General;  Laterality: Left;     Social History     Socioeconomic History    Marital status: Single   Tobacco Use    Smoking status: Never     Passive exposure: Never    Smokeless tobacco: Never   Substance and Sexual Activity    Alcohol use: Never    Drug use: Never    Sexual activity: Never      Family History   Problem Relation Age of Onset    Dementia Mother 80 - 89    Heart disease Mother     Colon cancer Father 91    Cancer Father         Colon Cancer    Throat cancer Maternal Grandmother     Skin cancer Maternal Grandmother     Cancer Sister         Carcinoma of the liver      Review of patient's allergies indicates:  No Known Allergies   Review of Systems   Constitutional:  Negative for activity change, fever and unexpected weight change.   HENT:  Negative for sore throat.    Eyes:  Negative for visual disturbance.   Respiratory:  Negative for cough and shortness of breath.    Cardiovascular:  Negative for chest pain.   Gastrointestinal:  Negative for abdominal pain, diarrhea, nausea and vomiting.   Endocrine: Negative for polyuria.   Genitourinary:  Negative for dysuria and hot flashes.   Integumentary:  Negative for rash.   Neurological:  Negative for weakness and headaches.   Hematological:  Negative for adenopathy.   Psychiatric/Behavioral:  Negative for confusion.          Objective:      Vitals:    02/08/24 1301   BP: (!) 144/76   Pulse: 65   Resp: 18   Temp: 97.8 °F (36.6 °C)       Physical Exam  Constitutional:       General: She is not in acute distress.      Appearance: Normal appearance. She is not ill-appearing.   HENT:      Head: Normocephalic and atraumatic.      Nose: Nose normal.      Mouth/Throat:      Mouth: Mucous membranes are moist.      Pharynx: Oropharynx is clear.   Eyes:      Extraocular Movements: Extraocular movements intact.      Conjunctiva/sclera: Conjunctivae normal.      Pupils: Pupils are equal, round, and reactive to light.   Cardiovascular:      Rate and Rhythm: Normal rate and regular rhythm.      Pulses: Normal pulses.      Heart sounds: Normal heart sounds. No murmur heard.  Pulmonary:      Effort: Pulmonary effort is normal. No respiratory distress.      Breath sounds: Normal breath sounds.   Abdominal:      General: There is no distension.      Palpations: Abdomen is soft.      Tenderness: There is no abdominal tenderness.   Musculoskeletal:         General: Normal range of motion.      Cervical back: Normal range of motion and neck supple.      Right lower leg: No edema.      Left lower leg: No edema.   Lymphadenopathy:      Cervical: No cervical adenopathy.   Skin:     General: Skin is warm and dry.   Neurological:      General: No focal deficit present.      Mental Status: She is alert and oriented to person, place, and time.         LABS AND IMAGING REVIEWED IN EPIC  11/16/2023 BL DG MG/ L US BREAST LIMITED at [facility]- which revealed on L MG in the area of palpable concern at the 12 o'clock anterior to middle depth, there is a spiculated mass with associated heterogeneous calcifications which spans on the order of 5 cm mammographically and extends towards the nipple which appears mildly retracted. On R MG, there are no suspicious findings. On L US at 12 o'clock 4 cm FN,  there is an irregular hypoechoic mass with associated calcifications, spanning approximately 4.7 x 1.7 x 1.8 cm.  This demonstrates linear hypoechoic component extending toward the nipple as well as a presumed satellite nodule measuring 1.0 x 0.7 by 0.8 cm at the 11  o'clock  2 cm FN.  No pathologically enlarged lymph nodes visualized in the left axilla.  BIRADS-5 highly suspicious; biopsy recommended.    Pathology:    12/6/23 Left Breast Biopsy:  FINAL DIAGNOSIS    LEFT BREAST BIOPSY 12 O'CLOCK 4 CMFN:    INVASIVE DUCTAL CARCINOMA, NOS TYPE, AT LEAST 1.5 CM GREATEST DIMENSION.    FOCAL DUCTAL CARCINOMA IN SITU, COMEDO TYPE, NUCLEAR GRADE 2, 1 MM GREATEST   EXTENT.    ADAM HISTOLOGIC SCORE:   -- TUBULE FORMATION: SCORE 2   -- NUCLEAR PLEOMORPHISM: SCORE 2   -- MITOTIC ACTIVITY: SCORE 2   -- OVERALL SCORE: 6/9 (GRADE 2).   ER/MN+, HER2-2+, Ki-67 27%    1/12/24 Left Lumpectomy:  FINAL DIAGNOSIS    1. LEFT BREAST (SIMPLE MASTECTOMY):    A) INVASIVE DUCTAL CARCINOMA (3.8 CM), GRADE 2 (2+2+2) WITH FOCAL   MICROCALCIFICATIONS AND FOCAL ORGANIZING PREVIOUS BIOPSY SITE.    B) DUCTAL CARCINOMA IN SITU, NOT EXTENSIVE (APPROXIMATELY 4 CM), GRADE 2, WITH   SOLID TO SLIGHTLY CRIBRIFORM ARCHITECTURE, FOCAL COMEDO NECROSIS AND FOCAL   MICROCALCIFICATIONS.    C) MULTIFOCAL LYMPHATIC TUMOR INVASION IDENTIFIED.    D) RESECTION MARGIN CLEAR (INVASIVE AND IN SITU TUMOR CLOSEST TO POSTERIOR   MARGIN AT 2.1 CM).    E) FATTY BREAST TISSUE WITH FOCAL NONPROLIFERATIVE FIBROCYSTIC CHANGES WITH   FOCAL MICROCALCIFICATIONS.    F) ONE LYMPH NODE NEGATIVE FOR METASTATIC TUMOR (0/1).    2. LYMPH NODE, LEFT AXILLARY SENTINEL #1 (EXCISION):    ONE LYMPH NODE POSITIVE FOR MICROMETASTATIC TUMOR (1/1).    3. LYMPH NODE, LEFT AXILLARY SENTINEL #2 (EXCISION):    ONE LYMPH NODE NEGATIVE FOR METASTATIC TUMOR (0/1).    4. LYMPH NODE, LEFT AXILLARY SENTINEL #3 (EXCISION):    TWO LYMPH NODES NEGATIVE FOR METASTATIC TUMOR (0/2).    5. LYMPH NODE, LEFT AXILLARY SENTINEL #4 (EXCISION):    ONE LYMPH NODE NEGATIVE FOR METASTATIC TUMOR (0/1).   [id number]; oncotype- 15    Assessment:   Stage 1B Left Breast Cancer - ER+/MN+/Her2 2+ FISH negative - s/p mastectomy 1/24. T2N1mi. Oncotype 15. No role for chemotherapy. May  consider axillary XRT due to micrometastatic disease to 1 lymph node.         Plan:       - No role for chemotherapy given oncotype 15  - Will need AI therapy for 5-10 years, will not start until decision regarding radiation has been made   - DEXA ordered in preparation for long term AI therapy  - Follow up Radiation oncology plans, meets with them next week  - RTC 8 weeks for MD or NP visit, labs same day to start AI and review DEXA. Can move up if she decides not to undergo radiation      I spent a total of 60 minutes on the day of the visit.This includes face to face time and non-face to face time preparing to see the patient (eg, review of tests), obtaining and/or reviewing separately obtained history, documenting clinical information in the electronic or other health record, independently interpreting results and communicating results to the patient/family/caregiver, or care coordinator.        Elizabeth Kennedy LeJeune, MD  Hematology/Oncology   [facility]        Geraldine BO LPN, acted solely as a scribe for and in the presence of Dr. Elizabeth Lejeune, who performed these services.

## 2024-02-08 ENCOUNTER — OFFICE VISIT (OUTPATIENT)
Dept: HEMATOLOGY/ONCOLOGY | Facility: CLINIC | Age: 84
End: 2024-02-08
Payer: MEDICARE

## 2024-02-08 VITALS
BODY MASS INDEX: 19.7 KG/M2 | HEART RATE: 65 BPM | OXYGEN SATURATION: 98 % | RESPIRATION RATE: 18 BRPM | HEIGHT: 64 IN | SYSTOLIC BLOOD PRESSURE: 144 MMHG | TEMPERATURE: 98 F | WEIGHT: 115.38 LBS | DIASTOLIC BLOOD PRESSURE: 76 MMHG

## 2024-02-08 DIAGNOSIS — Z98.890 STATUS POST LYMPH NODE BIOPSY: ICD-10-CM

## 2024-02-08 DIAGNOSIS — Z79.811 LONG TERM CURRENT USE OF AROMATASE INHIBITOR: Primary | ICD-10-CM

## 2024-02-08 DIAGNOSIS — Z17.0 MALIGNANT NEOPLASM OF OVERLAPPING SITES OF LEFT BREAST IN FEMALE, ESTROGEN RECEPTOR POSITIVE: ICD-10-CM

## 2024-02-08 DIAGNOSIS — Z17.0 MALIGNANT NEOPLASM OF OVERLAPPING SITES OF LEFT BREAST IN FEMALE, ESTROGEN RECEPTOR POSITIVE: Primary | ICD-10-CM

## 2024-02-08 DIAGNOSIS — Z90.12 STATUS POST LEFT MASTECTOMY: ICD-10-CM

## 2024-02-08 DIAGNOSIS — C50.812 MALIGNANT NEOPLASM OF OVERLAPPING SITES OF LEFT BREAST IN FEMALE, ESTROGEN RECEPTOR POSITIVE: Primary | ICD-10-CM

## 2024-02-08 DIAGNOSIS — C50.812 MALIGNANT NEOPLASM OF OVERLAPPING SITES OF LEFT BREAST IN FEMALE, ESTROGEN RECEPTOR POSITIVE: ICD-10-CM

## 2024-02-08 PROCEDURE — 99205 OFFICE O/P NEW HI 60 MIN: CPT | Mod: S$PBB,,, | Performed by: STUDENT IN AN ORGANIZED HEALTH CARE EDUCATION/TRAINING PROGRAM

## 2024-02-08 PROCEDURE — 99213 OFFICE O/P EST LOW 20 MIN: CPT | Mod: PBBFAC | Performed by: STUDENT IN AN ORGANIZED HEALTH CARE EDUCATION/TRAINING PROGRAM

## 2024-02-08 PROCEDURE — 99999 PR PBB SHADOW E&M-EST. PATIENT-LVL III: CPT | Mod: PBBFAC,,, | Performed by: STUDENT IN AN ORGANIZED HEALTH CARE EDUCATION/TRAINING PROGRAM

## 2024-02-08 NOTE — PROGRESS NOTES
Subjective:       Patient ID: Karo Lakhani is a 83 y.o. female.    Chief Complaint: New Patient    Diagnosis: Left Breast Cancer - Hormone Positive    Current Treatment: None    Treatment History:   Left Breast Lumpectomy with SLNBx - Dr. Escobar - 1/12/24    HPI:  84yo F presents in February '24 for evaluation of hormone positive left breast cancer. She felt a mass in her left breast in Fall '23 and underwent diagnostic imagine in November '23 which revealed at the 12 o'clock position 4 CMFN, an irregular hypoechoic mass with associated calcifications, spanning approximately 4.7 x 1.7 x 1.8 cm. There was also noted to be a linear hypoechoic component extending toward the nipple as well as a presumed satellite nodule measuring 1.0 x 0.7 by 0.8 cm at the 11 o'clock  2 CMFN.  No pathologically enlarged lymph nodes visualized in the left axilla. Biopsy of the mass revealed Invasive ductal Carcinoma, G2, ER 95%, [place] 94%, Her2 2+, Fish Negative, Ki67 27%. She underwent left breast lumpectomy with Dr. Escobar on 1/12/24 with final pathology revealing a 3.8cm G2 IDC with approx 4cm of G2 DCIS as well. Of the 6 lymph nodes sampled, 1 returned positive for micrometastatic disease measuring 0.4mm. She had an oncotype done with a score of 15. She presents to medical oncology for further evaluation.       Past Medical History:   Diagnosis Date    Cancer 10/23      Past Surgical History:   Procedure Laterality Date    AXILLARY NODE DISSECTION Left 01/12/2024    Procedure: LYMPHADENECTOMY, AXILLARY - LEFT;  Surgeon: Dayami Escobar MD;  Location: [facility] OR;  Service: General;  Laterality: Left;    BREAST SURGERY  01/12/2024    COLONOSCOPY      HIP FRACTURE SURGERY Left 10/2017    SENTINEL LYMPH NODE BIOPSY Left 01/12/2024    Procedure: BIOPSY, LYMPH NODE, SENTINEL - LEFT MagTrace Injected in office SentiMag needed No NUC MED;  Surgeon: Dayami Escobar MD;  Location: [facility] OR;  Service: General;  Laterality: Left;  MagTrace  Injected in office  SentiMag needed  No NUC MED    SIMPLE MASTECTOMY Left 01/12/2024    Procedure: MASTECTOMY, SIMPLE - LEFT;  Surgeon: Dayami Escobar MD;  Location: [facility] OR;  Service: General;  Laterality: Left;     Social History     Socioeconomic History    Marital status: Single   Tobacco Use    Smoking status: Never     Passive exposure: Never    Smokeless tobacco: Never   Substance and Sexual Activity    Alcohol use: Never    Drug use: Never    Sexual activity: Never      Family History   Problem Relation Age of Onset    Dementia Mother 80 - 89    Heart disease Mother     Colon cancer Father 91    Cancer Father         Colon Cancer    Throat cancer Maternal Grandmother     Skin cancer Maternal Grandmother     Cancer Sister         Carcinoma of the liver      Review of patient's allergies indicates:  No Known Allergies   Review of Systems   Constitutional:  Negative for activity change, fever and unexpected weight change.   HENT:  Negative for sore throat.    Eyes:  Negative for visual disturbance.   Respiratory:  Negative for cough and shortness of breath.    Cardiovascular:  Negative for chest pain.   Gastrointestinal:  Negative for abdominal pain, diarrhea, nausea and vomiting.   Endocrine: Negative for polyuria.   Genitourinary:  Negative for dysuria and hot flashes.   Integumentary:  Negative for rash.   Neurological:  Negative for weakness and headaches.   Hematological:  Negative for adenopathy.   Psychiatric/Behavioral:  Negative for confusion.          Objective:      Vitals:    02/08/24 1301   BP: (!) 144/76   Pulse: 65   Resp: 18   Temp: 97.8 °F (36.6 °C)       Physical Exam  Constitutional:       General: She is not in acute distress.     Appearance: Normal appearance. She is not ill-appearing.   HENT:      Head: Normocephalic and atraumatic.      Nose: Nose normal.      Mouth/Throat:      Mouth: Mucous membranes are moist.      Pharynx: Oropharynx is clear.   Eyes:      Extraocular Movements:  Extraocular movements intact.      Conjunctiva/sclera: Conjunctivae normal.      Pupils: Pupils are equal, round, and reactive to light.   Cardiovascular:      Rate and Rhythm: Normal rate and regular rhythm.      Pulses: Normal pulses.      Heart sounds: Normal heart sounds. No murmur heard.  Pulmonary:      Effort: Pulmonary effort is normal. No respiratory distress.      Breath sounds: Normal breath sounds.   Abdominal:      General: There is no distension.      Palpations: Abdomen is soft.      Tenderness: There is no abdominal tenderness.   Musculoskeletal:         General: Normal range of motion.      Cervical back: Normal range of motion and neck supple.      Right lower leg: No edema.      Left lower leg: No edema.   Lymphadenopathy:      Cervical: No cervical adenopathy.   Skin:     General: Skin is warm and dry.   Neurological:      General: No focal deficit present.      Mental Status: She is alert and oriented to person, place, and time.         LABS AND IMAGING REVIEWED IN EPIC  11/16/2023 BL DG MG/ L US BREAST LIMITED at [facility]- which revealed on L MG in the area of palpable concern at the 12 o'clock anterior to middle depth, there is a spiculated mass with associated heterogeneous calcifications which spans on the order of 5 cm mammographically and extends towards the nipple which appears mildly retracted. On R MG, there are no suspicious findings. On L US at 12 o'clock 4 cm FN,  there is an irregular hypoechoic mass with associated calcifications, spanning approximately 4.7 x 1.7 x 1.8 cm.  This demonstrates linear hypoechoic component extending toward the nipple as well as a presumed satellite nodule measuring 1.0 x 0.7 by 0.8 cm at the 11 o'clock  2 cm FN.  No pathologically enlarged lymph nodes visualized in the left axilla.  BIRADS-5 highly suspicious; biopsy recommended.    Pathology:    12/6/23 Left Breast Biopsy:  FINAL DIAGNOSIS    LEFT BREAST BIOPSY 12 O'CLOCK 4 CMFN:    INVASIVE DUCTAL  CARCINOMA, NOS TYPE, AT LEAST 1.5 CM GREATEST DIMENSION.    FOCAL DUCTAL CARCINOMA IN SITU, COMEDO TYPE, NUCLEAR GRADE 2, 1 MM GREATEST   EXTENT.    ADAM HISTOLOGIC SCORE:   -- TUBULE FORMATION: SCORE 2   -- NUCLEAR PLEOMORPHISM: SCORE 2   -- MITOTIC ACTIVITY: SCORE 2   -- OVERALL SCORE: 6/9 (GRADE 2).   ER/PA+, HER2-2+, Ki-67 27%    1/12/24 Left Lumpectomy:  FINAL DIAGNOSIS    1. LEFT BREAST (SIMPLE MASTECTOMY):    A) INVASIVE DUCTAL CARCINOMA (3.8 CM), GRADE 2 (2+2+2) WITH FOCAL   MICROCALCIFICATIONS AND FOCAL ORGANIZING PREVIOUS BIOPSY SITE.    B) DUCTAL CARCINOMA IN SITU, NOT EXTENSIVE (APPROXIMATELY 4 CM), GRADE 2, WITH   SOLID TO SLIGHTLY CRIBRIFORM ARCHITECTURE, FOCAL COMEDO NECROSIS AND FOCAL   MICROCALCIFICATIONS.    C) MULTIFOCAL LYMPHATIC TUMOR INVASION IDENTIFIED.    D) RESECTION MARGIN CLEAR (INVASIVE AND IN SITU TUMOR CLOSEST TO POSTERIOR   MARGIN AT 2.1 CM).    E) FATTY BREAST TISSUE WITH FOCAL NONPROLIFERATIVE FIBROCYSTIC CHANGES WITH   FOCAL MICROCALCIFICATIONS.    F) ONE LYMPH NODE NEGATIVE FOR METASTATIC TUMOR (0/1).    2. LYMPH NODE, LEFT AXILLARY SENTINEL #1 (EXCISION):    ONE LYMPH NODE POSITIVE FOR MICROMETASTATIC TUMOR (1/1).    3. LYMPH NODE, LEFT AXILLARY SENTINEL #2 (EXCISION):    ONE LYMPH NODE NEGATIVE FOR METASTATIC TUMOR (0/1).    4. LYMPH NODE, LEFT AXILLARY SENTINEL #3 (EXCISION):    TWO LYMPH NODES NEGATIVE FOR METASTATIC TUMOR (0/2).    5. LYMPH NODE, LEFT AXILLARY SENTINEL #4 (EXCISION):    ONE LYMPH NODE NEGATIVE FOR METASTATIC TUMOR (0/1).   [id number]; oncotype- 15    Assessment:   Stage 1B Left Breast Cancer - ER+/PA+/Her2 2+ FISH negative - s/p mastectomy 1/24. T2N1mi. Oncotype 15. No role for chemotherapy. May consider axillary XRT due to micrometastatic disease to 1 lymph node.         Plan:       - No role for chemotherapy given oncotype 15  - Will need AI therapy for 5-10 years, will not start until decision regarding radiation has been made, appointment 2/19/24  -  DEXA ordered in preparation for long term AI therapy  - RTC in 8 weeks with MD with labs  - cbc, cmp- 1 hr prior @ [facility]  May move appt up sooner if after radiation patient decides against radiation to start on AI sooner      I spent a total of 60 minutes on the day of the visit.This includes face to face time and non-face to face time preparing to see the patient (eg, review of tests), obtaining and/or reviewing separately obtained history, documenting clinical information in the electronic or other health record, independently interpreting results and communicating results to the patient/family/caregiver, or care coordinator.        Elizabeth Kennedy LeJeune, MD  Hematology/Oncology   [facility]        IGeraldine LPN, acted solely as a scribe for and in the presence of Dr. Elizabeth Lejeune, who performed these services.

## 2024-02-29 ENCOUNTER — HOSPITAL ENCOUNTER (OUTPATIENT)
Dept: RADIOLOGY | Facility: HOSPITAL | Age: 84
Discharge: HOME | End: 2024-02-29
Attending: STUDENT IN AN ORGANIZED HEALTH CARE EDUCATION/TRAINING PROGRAM
Payer: MEDICARE

## 2024-02-29 ENCOUNTER — LAB VISIT (OUTPATIENT)
Dept: LAB | Facility: HOSPITAL | Age: 84
End: 2024-02-29
Attending: STUDENT IN AN ORGANIZED HEALTH CARE EDUCATION/TRAINING PROGRAM
Payer: MEDICARE

## 2024-02-29 ENCOUNTER — OFFICE VISIT (OUTPATIENT)
Dept: HEMATOLOGY/ONCOLOGY | Facility: CLINIC | Age: 84
End: 2024-02-29
Payer: MEDICARE

## 2024-02-29 ENCOUNTER — DOCUMENTATION ONLY (OUTPATIENT)
Dept: HEMATOLOGY/ONCOLOGY | Facility: CLINIC | Age: 84
End: 2024-02-29
Payer: MEDICARE

## 2024-02-29 VITALS
WEIGHT: 115.38 LBS | OXYGEN SATURATION: 98 % | DIASTOLIC BLOOD PRESSURE: 77 MMHG | HEIGHT: 64 IN | HEART RATE: 68 BPM | BODY MASS INDEX: 19.7 KG/M2 | RESPIRATION RATE: 20 BRPM | TEMPERATURE: 98 F | SYSTOLIC BLOOD PRESSURE: 146 MMHG

## 2024-02-29 DIAGNOSIS — C50.812 MALIGNANT NEOPLASM OF OVERLAPPING SITES OF LEFT BREAST IN FEMALE, ESTROGEN RECEPTOR POSITIVE: ICD-10-CM

## 2024-02-29 DIAGNOSIS — Z79.811 LONG TERM (CURRENT) USE OF AROMATASE INHIBITORS: ICD-10-CM

## 2024-02-29 DIAGNOSIS — Z79.811 LONG TERM CURRENT USE OF AROMATASE INHIBITOR: ICD-10-CM

## 2024-02-29 DIAGNOSIS — Z17.0 MALIGNANT NEOPLASM OF OVERLAPPING SITES OF LEFT BREAST IN FEMALE, ESTROGEN RECEPTOR POSITIVE: ICD-10-CM

## 2024-02-29 DIAGNOSIS — Z17.0 MALIGNANT NEOPLASM OF OVERLAPPING SITES OF LEFT BREAST IN FEMALE, ESTROGEN RECEPTOR POSITIVE: Primary | ICD-10-CM

## 2024-02-29 DIAGNOSIS — C50.812 MALIGNANT NEOPLASM OF OVERLAPPING SITES OF LEFT BREAST IN FEMALE, ESTROGEN RECEPTOR POSITIVE: Primary | ICD-10-CM

## 2024-02-29 DIAGNOSIS — M81.0 AGE-RELATED OSTEOPOROSIS WITHOUT CURRENT PATHOLOGICAL FRACTURE: Primary | ICD-10-CM

## 2024-02-29 LAB
ALBUMIN SERPL-MCNC: 3.7 G/DL (ref 3.4–4.8)
ALBUMIN/GLOB SERPL: 1.5 RATIO (ref 1.1–2)
ALP SERPL-CCNC: 62 UNIT/L (ref 40–150)
ALT SERPL-CCNC: 13 UNIT/L (ref 0–55)
AST SERPL-CCNC: 21 UNIT/L (ref 5–34)
BASOPHILS # BLD AUTO: 0.05 X10(3)/MCL
BASOPHILS NFR BLD AUTO: 0.9 %
BILIRUB SERPL-MCNC: 0.4 MG/DL
BUN SERPL-MCNC: 14 MG/DL (ref 9.8–20.1)
CALCIUM SERPL-MCNC: 9.4 MG/DL (ref 8.4–10.2)
CHLORIDE SERPL-SCNC: 107 MMOL/L (ref 98–107)
CO2 SERPL-SCNC: 30 MMOL/L (ref 23–31)
CREAT SERPL-MCNC: 1.21 MG/DL (ref 0.55–1.02)
EOSINOPHIL # BLD AUTO: 0.14 X10(3)/MCL (ref 0–0.9)
EOSINOPHIL NFR BLD AUTO: 2.4 %
ERYTHROCYTE [DISTWIDTH] IN BLOOD BY AUTOMATED COUNT: 12.5 % (ref 11.5–17)
GFR SERPLBLD CREATININE-BSD FMLA CKD-EPI: 45 MLS/MIN/1.73/M2
GLOBULIN SER-MCNC: 2.5 GM/DL (ref 2.4–3.5)
GLUCOSE SERPL-MCNC: 85 MG/DL (ref 82–115)
HCT VFR BLD AUTO: 41.6 % (ref 37–47)
HGB BLD-MCNC: 13.3 G/DL (ref 12–16)
IMM GRANULOCYTES # BLD AUTO: 0.01 X10(3)/MCL (ref 0–0.04)
IMM GRANULOCYTES NFR BLD AUTO: 0.2 %
LYMPHOCYTES # BLD AUTO: 1.6 X10(3)/MCL (ref 0.6–4.6)
LYMPHOCYTES NFR BLD AUTO: 27.6 %
MCH RBC QN AUTO: 29.8 PG (ref 27–31)
MCHC RBC AUTO-ENTMCNC: 32 G/DL (ref 33–36)
MCV RBC AUTO: 93.3 FL (ref 80–94)
MONOCYTES # BLD AUTO: 0.56 X10(3)/MCL (ref 0.1–1.3)
MONOCYTES NFR BLD AUTO: 9.7 %
NEUTROPHILS # BLD AUTO: 3.43 X10(3)/MCL (ref 2.1–9.2)
NEUTROPHILS NFR BLD AUTO: 59.2 %
PLATELET # BLD AUTO: 233 X10(3)/MCL (ref 130–400)
PMV BLD AUTO: 10.6 FL (ref 7.4–10.4)
POTASSIUM SERPL-SCNC: 3.7 MMOL/L (ref 3.5–5.1)
PROT SERPL-MCNC: 6.2 GM/DL (ref 5.8–7.6)
RBC # BLD AUTO: 4.46 X10(6)/MCL (ref 4.2–5.4)
SODIUM SERPL-SCNC: 144 MMOL/L (ref 136–145)
WBC # SPEC AUTO: 5.79 X10(3)/MCL (ref 4.5–11.5)

## 2024-02-29 PROCEDURE — 77080 DXA BONE DENSITY AXIAL: CPT | Mod: TC

## 2024-02-29 PROCEDURE — 80053 COMPREHEN METABOLIC PANEL: CPT

## 2024-02-29 PROCEDURE — 99999 PR PBB SHADOW E&M-EST. PATIENT-LVL III: CPT | Mod: PBBFAC,,, | Performed by: STUDENT IN AN ORGANIZED HEALTH CARE EDUCATION/TRAINING PROGRAM

## 2024-02-29 PROCEDURE — 99215 OFFICE O/P EST HI 40 MIN: CPT | Mod: S$PBB,,, | Performed by: STUDENT IN AN ORGANIZED HEALTH CARE EDUCATION/TRAINING PROGRAM

## 2024-02-29 PROCEDURE — 36415 COLL VENOUS BLD VENIPUNCTURE: CPT

## 2024-02-29 PROCEDURE — 85025 COMPLETE CBC W/AUTO DIFF WBC: CPT

## 2024-02-29 PROCEDURE — 99213 OFFICE O/P EST LOW 20 MIN: CPT | Mod: PBBFAC,25 | Performed by: STUDENT IN AN ORGANIZED HEALTH CARE EDUCATION/TRAINING PROGRAM

## 2024-02-29 RX ORDER — LETROZOLE 2.5 MG/1
2.5 TABLET, FILM COATED ORAL DAILY
Qty: 30 TABLET | Refills: 2 | Status: SHIPPED | OUTPATIENT
Start: 2024-02-29 | End: 2024-04-11 | Stop reason: SDUPTHER

## 2024-02-29 NOTE — PROGRESS NOTES
Subjective:       Patient ID: Karo Lakhani is a 83 y.o. female.    Chief Complaint: Follow up    Diagnosis: Left Breast Cancer - Hormone Positive    Current Treatment: None    Treatment History:   Left Breast Lumpectomy with SLNBx - Dr. Escobar - 1/12/24    HPI:  84yo F presented in February '24 for evaluation of hormone positive left breast cancer. She felt a mass in her left breast in Fall '23 and underwent diagnostic imagine in November '23 which revealed at the 12 o'clock position 4 CMFN, an irregular hypoechoic mass with associated calcifications, spanning approximately 4.7 x 1.7 x 1.8 cm. There was also noted to be a linear hypoechoic component extending toward the nipple as well as a presumed satellite nodule measuring 1.0 x 0.7 by 0.8 cm at the 11 o'clock  2 CMFN.  No pathologically enlarged lymph nodes visualized in the left axilla. Biopsy of the mass revealed Invasive ductal Carcinoma, G2, ER 95%, IL 94%, Her2 2+, Fish Negative, Ki67 27%. She underwent left breast lumpectomy with Dr. Escobar on 1/12/24 with final pathology revealing a 3.8cm G2 IDC with approx 4cm of G2 DCIS as well. Of the 6 lymph nodes sampled, 1 returned positive for micrometastatic disease measuring 0.4mm. She had an oncotype done with a score of 15. Chemotherapy was not recommended given oncotype score. She was evaluated by radiation oncology who did not recommend adjuvant radiation. Plan is for adjuvant antihormonal therapy for at least 5 years.     Interval History:  Patient is here for follow up.   She has no complaints today, recently saw rad onc who do not recommend XRT.   I reviewed the side effects, MOA, and benefits of AI therapy in detail with patient and her sister again today.   Denies any new breast concerns, lump/bumps, SOB, chills fever, recent hospitalizations.       Past Medical History:   Diagnosis Date    Cancer 10/23      Past Surgical History:   Procedure Laterality Date    AXILLARY NODE DISSECTION Left 01/12/2024     Procedure: LYMPHADENECTOMY, AXILLARY - LEFT;  Surgeon: Dayami Escobar MD;  Location: [facility] OR;  Service: General;  Laterality: Left;    BREAST SURGERY  01/12/2024    COLONOSCOPY      HIP FRACTURE SURGERY Left 10/2017    SENTINEL LYMPH NODE BIOPSY Left 01/12/2024    Procedure: BIOPSY, LYMPH NODE, SENTINEL - LEFT MagTrace Injected in office SentiMag needed No NUC MED;  Surgeon: Dayami Escobar MD;  Location: [facility] OR;  Service: General;  Laterality: Left;  MagTrace Injected in office  SentiMag needed  No NUC MED    SIMPLE MASTECTOMY Left 01/12/2024    Procedure: MASTECTOMY, SIMPLE - LEFT;  Surgeon: Dayami Escobar MD;  Location: [facility] OR;  Service: General;  Laterality: Left;     Social History     Socioeconomic History    Marital status: Single   Tobacco Use    Smoking status: Never     Passive exposure: Never    Smokeless tobacco: Never   Substance and Sexual Activity    Alcohol use: Never    Drug use: Never    Sexual activity: Never      Family History   Problem Relation Age of Onset    Dementia Mother 80 - 89    Heart disease Mother     Colon cancer Father 91    Cancer Father         Colon Cancer    Throat cancer Maternal Grandmother     Skin cancer Maternal Grandmother     Cancer Sister         Carcinoma of the liver      Review of patient's allergies indicates:  No Known Allergies   Review of Systems   Constitutional:  Negative for activity change, fever and unexpected weight change.   HENT:  Negative for sore throat.    Eyes:  Negative for visual disturbance.   Respiratory:  Negative for cough and shortness of breath.    Cardiovascular:  Negative for chest pain.   Gastrointestinal:  Negative for abdominal pain, diarrhea, nausea and vomiting.   Endocrine: Negative for polyuria.   Genitourinary:  Negative for dysuria and hot flashes.   Integumentary:  Negative for rash.   Neurological:  Negative for weakness and headaches.   Hematological:  Negative for adenopathy.   Psychiatric/Behavioral:  Negative  for confusion.          Objective:      Vitals:    02/29/24 1234   BP: (!) 146/77   Pulse: 68   Resp: 20   Temp: 97.9 °F (36.6 °C)       Physical Exam  Constitutional:       General: She is not in acute distress.     Appearance: Normal appearance. She is not ill-appearing.   HENT:      Head: Normocephalic and atraumatic.      Nose: Nose normal.      Mouth/Throat:      Mouth: Mucous membranes are moist.      Pharynx: Oropharynx is clear.   Eyes:      Extraocular Movements: Extraocular movements intact.      Conjunctiva/sclera: Conjunctivae normal.      Pupils: Pupils are equal, round, and reactive to light.   Cardiovascular:      Rate and Rhythm: Normal rate and regular rhythm.      Pulses: Normal pulses.      Heart sounds: Normal heart sounds. No murmur heard.  Pulmonary:      Effort: Pulmonary effort is normal. No respiratory distress.      Breath sounds: Normal breath sounds.   Abdominal:      General: There is no distension.      Palpations: Abdomen is soft.      Tenderness: There is no abdominal tenderness.   Musculoskeletal:         General: Normal range of motion.      Cervical back: Normal range of motion and neck supple.      Right lower leg: No edema.      Left lower leg: No edema.   Lymphadenopathy:      Cervical: No cervical adenopathy.   Skin:     General: Skin is warm and dry.   Neurological:      General: No focal deficit present.      Mental Status: She is alert and oriented to person, place, and time.         LABS AND IMAGING REVIEWED IN EPIC  11/16/2023 BL DG MG/ L US BREAST LIMITED at [facility]- which revealed on L MG in the area of palpable concern at the 12 o'clock anterior to middle depth, there is a spiculated mass with associated heterogeneous calcifications which spans on the order of 5 cm mammographically and extends towards the nipple which appears mildly retracted. On R MG, there are no suspicious findings. On L US at 12 o'clock 4 cm FN,  there is an irregular hypoechoic mass with associated  calcifications, spanning approximately 4.7 x 1.7 x 1.8 cm.  This demonstrates linear hypoechoic component extending toward the nipple as well as a presumed satellite nodule measuring 1.0 x 0.7 by 0.8 cm at the 11 o'clock  2 cm FN.  No pathologically enlarged lymph nodes visualized in the left axilla.  BIRADS-5 highly suspicious; biopsy recommended.  2/29/24 DEXA:  1. Osteoporosis of the lumbar spine  2. Osteopenia of the distal left radius  3. Osteoporosis of the right hip    Pathology:    12/6/23 Left Breast Biopsy:  FINAL DIAGNOSIS    LEFT BREAST BIOPSY 12 O'CLOCK 4 CMFN:    INVASIVE DUCTAL CARCINOMA, NOS TYPE, AT LEAST 1.5 CM GREATEST DIMENSION.    FOCAL DUCTAL CARCINOMA IN SITU, COMEDO TYPE, NUCLEAR GRADE 2, 1 MM GREATEST   EXTENT.    ADAM HISTOLOGIC SCORE:   -- TUBULE FORMATION: SCORE 2   -- NUCLEAR PLEOMORPHISM: SCORE 2   -- MITOTIC ACTIVITY: SCORE 2   -- OVERALL SCORE: 6/9 (GRADE 2).   ER/MN+, HER2-2+, Ki-67 27%    1/12/24 Left Lumpectomy:  FINAL DIAGNOSIS    1. LEFT BREAST (SIMPLE MASTECTOMY):    A) INVASIVE DUCTAL CARCINOMA (3.8 CM), GRADE 2 (2+2+2) WITH FOCAL   MICROCALCIFICATIONS AND FOCAL ORGANIZING PREVIOUS BIOPSY SITE.    B) DUCTAL CARCINOMA IN SITU, NOT EXTENSIVE (APPROXIMATELY 4 CM), GRADE 2, WITH   SOLID TO SLIGHTLY CRIBRIFORM ARCHITECTURE, FOCAL COMEDO NECROSIS AND FOCAL   MICROCALCIFICATIONS.    C) MULTIFOCAL LYMPHATIC TUMOR INVASION IDENTIFIED.    D) RESECTION MARGIN CLEAR (INVASIVE AND IN SITU TUMOR CLOSEST TO POSTERIOR   MARGIN AT 2.1 CM).    E) FATTY BREAST TISSUE WITH FOCAL NONPROLIFERATIVE FIBROCYSTIC CHANGES WITH   FOCAL MICROCALCIFICATIONS.    F) ONE LYMPH NODE NEGATIVE FOR METASTATIC TUMOR (0/1).    2. LYMPH NODE, LEFT AXILLARY SENTINEL #1 (EXCISION):    ONE LYMPH NODE POSITIVE FOR MICROMETASTATIC TUMOR (1/1).    3. LYMPH NODE, LEFT AXILLARY SENTINEL #2 (EXCISION):    ONE LYMPH NODE NEGATIVE FOR METASTATIC TUMOR (0/1).    4. LYMPH NODE, LEFT AXILLARY SENTINEL #3 (EXCISION):    TWO  LYMPH NODES NEGATIVE FOR METASTATIC TUMOR (0/2).    5. LYMPH NODE, LEFT AXILLARY SENTINEL #4 (EXCISION):    ONE LYMPH NODE NEGATIVE FOR METASTATIC TUMOR (0/1).   [id number]; oncotype- 15    Assessment:   Stage 1B Left Breast Cancer - ER+/TX+/Her2 2+ FISH negative - s/p mastectomy 1/24. T2N1mi. Oncotype 15. No role for chemotherapy. Deferred axillary XRT due to age and micromet disease. Plan for AI x 5 years.     Dexa 2/24 - Osteoporosis, repeat  2/26        Plan:     Toxicity reviewed, okay to start leterozole 2.5 mg daily  Will order Prolia q6m, can start once approved and has dental clearance, discuss at 6 week tox check  RTC 6 weeks for NP visit/labs for AI toxicity check and prolia start  Cbc, cmp- 1 hr prior @ [facility]    I spent a total of 40 minutes on the day of the visit.This includes face to face time and non-face to face time preparing to see the patient (eg, review of tests), obtaining and/or reviewing separately obtained history, documenting clinical information in the electronic or other health record, independently interpreting results and communicating results to the patient/family/caregiver, or care coordinator.    Elizabeth Kennedy LeJeune, MD  Hematology/Oncology   [facility], Geraldine Lugo LPN, acted solely as a scribe for and in the presence of Dr. Elizabeth Lejeune, who performed these services.

## 2024-04-01 DIAGNOSIS — M81.0 AGE-RELATED OSTEOPOROSIS WITHOUT CURRENT PATHOLOGICAL FRACTURE: Primary | ICD-10-CM

## 2024-04-01 DIAGNOSIS — R94.120 ABNORMAL AUDITORY FUNCTION STUDY: ICD-10-CM

## 2024-04-11 ENCOUNTER — OFFICE VISIT (OUTPATIENT)
Dept: HEMATOLOGY/ONCOLOGY | Facility: CLINIC | Age: 84
End: 2024-04-11
Payer: MEDICARE

## 2024-04-11 ENCOUNTER — APPOINTMENT (OUTPATIENT)
Dept: LAB | Facility: HOSPITAL | Age: 84
End: 2024-04-11
Payer: MEDICARE

## 2024-04-11 VITALS
SYSTOLIC BLOOD PRESSURE: 150 MMHG | RESPIRATION RATE: 20 BRPM | DIASTOLIC BLOOD PRESSURE: 73 MMHG | WEIGHT: 112 LBS | OXYGEN SATURATION: 100 % | HEART RATE: 60 BPM | HEIGHT: 64 IN | TEMPERATURE: 98 F | BODY MASS INDEX: 19.12 KG/M2

## 2024-04-11 DIAGNOSIS — Z90.12 STATUS POST LEFT MASTECTOMY: ICD-10-CM

## 2024-04-11 DIAGNOSIS — M81.0 AGE-RELATED OSTEOPOROSIS WITHOUT CURRENT PATHOLOGICAL FRACTURE: ICD-10-CM

## 2024-04-11 DIAGNOSIS — Z79.811 LONG TERM (CURRENT) USE OF AROMATASE INHIBITORS: ICD-10-CM

## 2024-04-11 DIAGNOSIS — Z17.0 MALIGNANT NEOPLASM OF OVERLAPPING SITES OF LEFT BREAST IN FEMALE, ESTROGEN RECEPTOR POSITIVE: Primary | ICD-10-CM

## 2024-04-11 DIAGNOSIS — Z79.811 LONG TERM CURRENT USE OF AROMATASE INHIBITOR: ICD-10-CM

## 2024-04-11 DIAGNOSIS — Z98.890 STATUS POST LYMPH NODE BIOPSY: ICD-10-CM

## 2024-04-11 DIAGNOSIS — C50.812 MALIGNANT NEOPLASM OF OVERLAPPING SITES OF LEFT BREAST IN FEMALE, ESTROGEN RECEPTOR POSITIVE: Primary | ICD-10-CM

## 2024-04-11 DIAGNOSIS — R94.120 ABNORMAL AUDITORY FUNCTION STUDY: ICD-10-CM

## 2024-04-11 LAB
ALBUMIN SERPL-MCNC: 3.9 G/DL (ref 3.4–4.8)
ALBUMIN/GLOB SERPL: 1.6 RATIO (ref 1.1–2)
ALP SERPL-CCNC: 60 UNIT/L (ref 40–150)
ALT SERPL-CCNC: 12 UNIT/L (ref 0–55)
AST SERPL-CCNC: 21 UNIT/L (ref 5–34)
BASOPHILS # BLD AUTO: 0.04 X10(3)/MCL
BASOPHILS NFR BLD AUTO: 0.6 %
BILIRUB SERPL-MCNC: 0.5 MG/DL
BUN SERPL-MCNC: 16 MG/DL (ref 9.8–20.1)
CALCIUM SERPL-MCNC: 9.5 MG/DL (ref 8.4–10.2)
CHLORIDE SERPL-SCNC: 109 MMOL/L (ref 98–107)
CO2 SERPL-SCNC: 28 MMOL/L (ref 23–31)
CREAT SERPL-MCNC: 0.97 MG/DL (ref 0.55–1.02)
EOSINOPHIL # BLD AUTO: 0.1 X10(3)/MCL (ref 0–0.9)
EOSINOPHIL NFR BLD AUTO: 1.6 %
ERYTHROCYTE [DISTWIDTH] IN BLOOD BY AUTOMATED COUNT: 12.4 % (ref 11.5–17)
GFR SERPLBLD CREATININE-BSD FMLA CKD-EPI: 58 MLS/MIN/1.73/M2
GLOBULIN SER-MCNC: 2.5 GM/DL (ref 2.4–3.5)
GLUCOSE SERPL-MCNC: 100 MG/DL (ref 82–115)
HCT VFR BLD AUTO: 42.5 % (ref 37–47)
HGB BLD-MCNC: 13.9 G/DL (ref 12–16)
IMM GRANULOCYTES # BLD AUTO: 0.01 X10(3)/MCL (ref 0–0.04)
IMM GRANULOCYTES NFR BLD AUTO: 0.2 %
LYMPHOCYTES # BLD AUTO: 1.6 X10(3)/MCL (ref 0.6–4.6)
LYMPHOCYTES NFR BLD AUTO: 25.8 %
MCH RBC QN AUTO: 30 PG (ref 27–31)
MCHC RBC AUTO-ENTMCNC: 32.7 G/DL (ref 33–36)
MCV RBC AUTO: 91.8 FL (ref 80–94)
MONOCYTES # BLD AUTO: 0.5 X10(3)/MCL (ref 0.1–1.3)
MONOCYTES NFR BLD AUTO: 8.1 %
NEUTROPHILS # BLD AUTO: 3.94 X10(3)/MCL (ref 2.1–9.2)
NEUTROPHILS NFR BLD AUTO: 63.7 %
PLATELET # BLD AUTO: 232 X10(3)/MCL (ref 130–400)
PMV BLD AUTO: 10 FL (ref 7.4–10.4)
POTASSIUM SERPL-SCNC: 3.8 MMOL/L (ref 3.5–5.1)
PROT SERPL-MCNC: 6.4 GM/DL (ref 5.8–7.6)
RBC # BLD AUTO: 4.63 X10(6)/MCL (ref 4.2–5.4)
SODIUM SERPL-SCNC: 141 MMOL/L (ref 136–145)
WBC # SPEC AUTO: 6.19 X10(3)/MCL (ref 4.5–11.5)

## 2024-04-11 PROCEDURE — 99214 OFFICE O/P EST MOD 30 MIN: CPT | Mod: PBBFAC

## 2024-04-11 PROCEDURE — 85025 COMPLETE CBC W/AUTO DIFF WBC: CPT

## 2024-04-11 PROCEDURE — 99215 OFFICE O/P EST HI 40 MIN: CPT | Mod: S$PBB,,,

## 2024-04-11 PROCEDURE — 99999 PR PBB SHADOW E&M-EST. PATIENT-LVL IV: CPT | Mod: PBBFAC,,,

## 2024-04-11 PROCEDURE — 36415 COLL VENOUS BLD VENIPUNCTURE: CPT

## 2024-04-11 PROCEDURE — 80053 COMPREHEN METABOLIC PANEL: CPT

## 2024-04-11 RX ORDER — LETROZOLE 2.5 MG/1
2.5 TABLET, FILM COATED ORAL DAILY
Qty: 30 TABLET | Refills: 2 | Status: SHIPPED | OUTPATIENT
Start: 2024-04-11 | End: 2025-04-11

## 2024-04-11 NOTE — PROGRESS NOTES
Subjective:       Patient ID: Karo Lakhani is a 83 y.o. female.    Chief Complaint: Follow up    Diagnosis: Left Breast Cancer - Hormone Positive    Current Treatment:   Letrozole 3/1/2024-present  Prolia to begin once dental clearance received    Treatment History:   Left Breast Lumpectomy with SLNBx - Dr. Escobar - 1/12/24    HPI:  82yo F presented in February '24 for evaluation of hormone positive left breast cancer. She felt a mass in her left breast in Fall '23 and underwent diagnostic imagine in November '23 which revealed at the 12 o'clock position 4 CMFN, an irregular hypoechoic mass with associated calcifications, spanning approximately 4.7 x 1.7 x 1.8 cm. There was also noted to be a linear hypoechoic component extending toward the nipple as well as a presumed satellite nodule measuring 1.0 x 0.7 by 0.8 cm at the 11 o'clock  2 CMFN.  No pathologically enlarged lymph nodes visualized in the left axilla. Biopsy of the mass revealed Invasive ductal Carcinoma, G2, ER 95%, HI 94%, Her2 2+, Fish Negative, Ki67 27%. She underwent left breast lumpectomy with Dr. Escobar on 1/12/24 with final pathology revealing a 3.8cm G2 IDC with approx 4cm of G2 DCIS as well. Of the 6 lymph nodes sampled, 1 returned positive for micrometastatic disease measuring 0.4mm. She had an oncotype done with a score of 15. Chemotherapy was not recommended given oncotype score. She was evaluated by radiation oncology who did not recommend adjuvant radiation. Plan is for adjuvant antihormonal therapy for at least 5 years.     Interval History:  She returns to the clinic today or a 6 week tox check regarding her letrozole. She began taking her medication on 3/1/2024 and reports tolerance and compliance, with no side effects noted. She was unable to obtain dental clearance for prolia as she needs multiple teeth pulled before being able to start. She does not have an anticipated time for these extractions at this time. She will follow-up  soon. She is scheduled to see breast surgery on 4/24/2024 for a follow-up. Mammogram currently ordered for 11/16/2024 and scheduled at breast center. She denies any breast complaints today. She denies any SOB, CP, headache, swelling, mood swings, vaginal dryness, joint pain, or hot flashes.       Past Medical History:   Diagnosis Date    Cancer 10/23      Past Surgical History:   Procedure Laterality Date    AXILLARY NODE DISSECTION Left 01/12/2024    Procedure: LYMPHADENECTOMY, AXILLARY - LEFT;  Surgeon: Dayami Escobar MD;  Location: [facility] OR;  Service: General;  Laterality: Left;    BREAST SURGERY  01/12/2024    COLONOSCOPY      HIP FRACTURE SURGERY Left 10/2017    SENTINEL LYMPH NODE BIOPSY Left 01/12/2024    Procedure: BIOPSY, LYMPH NODE, SENTINEL - LEFT MagTrace Injected in office SentiMag needed No NUC MED;  Surgeon: Dayami Escobar MD;  Location: [facility] OR;  Service: General;  Laterality: Left;  MagTrace Injected in office  SentiMag needed  No NUC MED    SIMPLE MASTECTOMY Left 01/12/2024    Procedure: MASTECTOMY, SIMPLE - LEFT;  Surgeon: Dayami Escobar MD;  Location: [facility];  Service: General;  Laterality: Left;     Social History     Socioeconomic History    Marital status: Single   Tobacco Use    Smoking status: Never     Passive exposure: Never    Smokeless tobacco: Never   Substance and Sexual Activity    Alcohol use: Never    Drug use: Never    Sexual activity: Never      Family History   Problem Relation Age of Onset    Dementia Mother 80 - 89    Heart disease Mother     Colon cancer Father 91    Cancer Father         Colon Cancer    Throat cancer Maternal Grandmother     Skin cancer Maternal Grandmother     Cancer Sister         Carcinoma of the liver      Review of patient's allergies indicates:  No Known Allergies   Review of Systems   Constitutional:  Negative for activity change, fever and unexpected weight change.   HENT:  Negative for sore throat.    Eyes:  Negative for visual  disturbance.   Respiratory:  Negative for cough and shortness of breath.    Cardiovascular:  Negative for chest pain.   Gastrointestinal:  Negative for abdominal pain, diarrhea, nausea and vomiting.   Endocrine: Negative for polyuria.   Genitourinary:  Negative for dysuria and hot flashes.   Integumentary:  Negative for rash.   Neurological:  Negative for weakness and headaches.   Hematological:  Negative for adenopathy.   Psychiatric/Behavioral:  Negative for confusion.          Objective:      Vitals:    04/11/24 1408   BP: (!) 150/73   Pulse:    Resp:    Temp:          Physical Exam  Constitutional:       General: She is not in acute distress.     Appearance: Normal appearance. She is not ill-appearing.   HENT:      Head: Normocephalic and atraumatic.      Nose: Nose normal.      Mouth/Throat:      Mouth: Mucous membranes are moist.      Pharynx: Oropharynx is clear.   Eyes:      Extraocular Movements: Extraocular movements intact.      Conjunctiva/sclera: Conjunctivae normal.      Pupils: Pupils are equal, round, and reactive to light.   Cardiovascular:      Rate and Rhythm: Normal rate and regular rhythm.      Pulses: Normal pulses.      Heart sounds: Normal heart sounds. No murmur heard.  Pulmonary:      Effort: Pulmonary effort is normal. No respiratory distress.      Breath sounds: Normal breath sounds.   Chest:      Comments: Well healed left breast mastectomy with scar tissue noted to outer area of incision.  No adenopathy, masses, rashes, or drainage noted bilaterally.   Abdominal:      General: There is no distension.      Palpations: Abdomen is soft.      Tenderness: There is no abdominal tenderness.   Musculoskeletal:         General: Normal range of motion.      Cervical back: Normal range of motion and neck supple.      Right lower leg: No edema.      Left lower leg: No edema.   Lymphadenopathy:      Cervical: No cervical adenopathy.   Skin:     General: Skin is warm and dry.   Neurological:       General: No focal deficit present.      Mental Status: She is alert and oriented to person, place, and time.         LABS AND IMAGING REVIEWED IN EPIC  11/16/2023 BL DG MG/ L US BREAST LIMITED at [facility]- which revealed on L MG in the area of palpable concern at the 12 o'clock anterior to middle depth, there is a spiculated mass with associated heterogeneous calcifications which spans on the order of 5 cm mammographically and extends towards the nipple which appears mildly retracted. On R MG, there are no suspicious findings. On L US at 12 o'clock 4 cm FN,  there is an irregular hypoechoic mass with associated calcifications, spanning approximately 4.7 x 1.7 x 1.8 cm.  This demonstrates linear hypoechoic component extending toward the nipple as well as a presumed satellite nodule measuring 1.0 x 0.7 by 0.8 cm at the 11 o'clock  2 cm FN.  No pathologically enlarged lymph nodes visualized in the left axilla.  BIRADS-5 highly suspicious; biopsy recommended.  2/29/24 DEXA:  1. Osteoporosis of the lumbar spine  2. Osteopenia of the distal left radius  3. Osteoporosis of the right hip    Pathology:    12/6/23 Left Breast Biopsy:  FINAL DIAGNOSIS    LEFT BREAST BIOPSY 12 O'CLOCK 4 CMFN:    INVASIVE DUCTAL CARCINOMA, NOS TYPE, AT LEAST 1.5 CM GREATEST DIMENSION.    FOCAL DUCTAL CARCINOMA IN SITU, COMEDO TYPE, NUCLEAR GRADE 2, 1 MM GREATEST   EXTENT.    ADAM HISTOLOGIC SCORE:   -- TUBULE FORMATION: SCORE 2   -- NUCLEAR PLEOMORPHISM: SCORE 2   -- MITOTIC ACTIVITY: SCORE 2   -- OVERALL SCORE: 6/9 (GRADE 2).   ER/FL+, HER2-2+, Ki-67 27%    1/12/24 Left Lumpectomy:  FINAL DIAGNOSIS    1. LEFT BREAST (SIMPLE MASTECTOMY):    A) INVASIVE DUCTAL CARCINOMA (3.8 CM), GRADE 2 (2+2+2) WITH FOCAL   MICROCALCIFICATIONS AND FOCAL ORGANIZING PREVIOUS BIOPSY SITE.    B) DUCTAL CARCINOMA IN SITU, NOT EXTENSIVE (APPROXIMATELY 4 CM), GRADE 2, WITH   SOLID TO SLIGHTLY CRIBRIFORM ARCHITECTURE, FOCAL COMEDO NECROSIS AND FOCAL    MICROCALCIFICATIONS.    C) MULTIFOCAL LYMPHATIC TUMOR INVASION IDENTIFIED.    D) RESECTION MARGIN CLEAR (INVASIVE AND IN SITU TUMOR CLOSEST TO POSTERIOR   MARGIN AT 2.1 CM).    E) FATTY BREAST TISSUE WITH FOCAL NONPROLIFERATIVE FIBROCYSTIC CHANGES WITH   FOCAL MICROCALCIFICATIONS.    F) ONE LYMPH NODE NEGATIVE FOR METASTATIC TUMOR (0/1).    2. LYMPH NODE, LEFT AXILLARY SENTINEL #1 (EXCISION):    ONE LYMPH NODE POSITIVE FOR MICROMETASTATIC TUMOR (1/1).    3. LYMPH NODE, LEFT AXILLARY SENTINEL #2 (EXCISION):    ONE LYMPH NODE NEGATIVE FOR METASTATIC TUMOR (0/1).    4. LYMPH NODE, LEFT AXILLARY SENTINEL #3 (EXCISION):    TWO LYMPH NODES NEGATIVE FOR METASTATIC TUMOR (0/2).    5. LYMPH NODE, LEFT AXILLARY SENTINEL #4 (EXCISION):    ONE LYMPH NODE NEGATIVE FOR METASTATIC TUMOR (0/1).   [id number]; oncotype- 15    Assessment:   Stage 1B Left Breast Cancer - ER+/DE+/Her2 2+ FISH negative - s/p mastectomy 1/24. T2N1mi. Oncotype 15. No role for chemotherapy. Deferred axillary XRT due to age and micromet disease. Plan for AI x 5 years.   Letrozole started 3/1/2024.  Dexa 2/24   Osteoporosis noted, prolia pending dental clearance   repeat due 2/2026  She was instructed to begin calcium and vitamin D supplement daily, weight bearing exercise was encouraged.           Plan:   Labs stable.  Continue with letrozole 2.5 mg daily, refill sent  Prolia ordered, awaiting dental clearance.  Start calcium and vitamin D supplement  Follow-up with breast surgery 4/24/2024.  Right screening mammogram due 11/16/2024, ordered by breast surgery  DEXA due 2/2026.  RTC in 3 months with NP for FU/lab      I spent a total of 40 minutes on the day of the visit.This includes face to face time and non-face to face time preparing to see the patient (eg, review of tests), obtaining and/or reviewing separately obtained history, documenting clinical information in the electronic or other health record, independently interpreting results and communicating  results to the patient/family/caregiver, or care coordinator.    Jeane Benavides, CRISTYP-C  Oncology/Hematology   [facility]

## 2024-04-24 ENCOUNTER — OFFICE VISIT (OUTPATIENT)
Dept: SURGERY | Facility: CLINIC | Age: 84
End: 2024-04-24
Payer: MEDICARE

## 2024-04-24 VITALS
TEMPERATURE: 98 F | DIASTOLIC BLOOD PRESSURE: 74 MMHG | SYSTOLIC BLOOD PRESSURE: 116 MMHG | HEIGHT: 64 IN | RESPIRATION RATE: 18 BRPM | WEIGHT: 112.38 LBS | OXYGEN SATURATION: 96 % | BODY MASS INDEX: 19.18 KG/M2 | HEART RATE: 62 BPM

## 2024-04-24 DIAGNOSIS — Z90.12 STATUS POST LEFT MASTECTOMY: ICD-10-CM

## 2024-04-24 DIAGNOSIS — Z98.890 STATUS POST LYMPH NODE BIOPSY: ICD-10-CM

## 2024-04-24 DIAGNOSIS — C50.812 MALIGNANT NEOPLASM OF OVERLAPPING SITES OF LEFT BREAST IN FEMALE, ESTROGEN RECEPTOR POSITIVE: Primary | ICD-10-CM

## 2024-04-24 DIAGNOSIS — Z17.0 MALIGNANT NEOPLASM OF OVERLAPPING SITES OF LEFT BREAST IN FEMALE, ESTROGEN RECEPTOR POSITIVE: Primary | ICD-10-CM

## 2024-04-24 PROCEDURE — 99999 PR PBB SHADOW E&M-EST. PATIENT-LVL III: CPT | Mod: PBBFAC,,, | Performed by: PHYSICIAN ASSISTANT

## 2024-04-24 PROCEDURE — 99213 OFFICE O/P EST LOW 20 MIN: CPT | Mod: PBBFAC | Performed by: PHYSICIAN ASSISTANT

## 2024-04-24 PROCEDURE — 99214 OFFICE O/P EST MOD 30 MIN: CPT | Mod: S$PBB,,, | Performed by: PHYSICIAN ASSISTANT

## 2024-04-24 RX ORDER — CHROMIUM PICOLINATE 200 MCG
TABLET ORAL ONCE
COMMUNITY

## 2024-04-24 NOTE — PROGRESS NOTES
[facility] Breast Surg  Breast Surgical Oncology  Follow-Up Patient Office Visit       Referring Provider: No ref. provider found  PCP: Arline Greenwood NP     Chief Complaint:   Chief Complaint   Patient presents with    Follow-up     Pateint reports no breast concerns        Subjective:   Treatment History:  2024: Left Mastectomy with Left SLNB  Letrozole 3/1/2024-present   Consultation with radiation oncology. XRT not recommended    Interval History:  2024 - Karo Lakhani presents for follow up for breast cancer. She is doing well since her surgery. She started Femara and is tolerating well. No concerns today.    HPI:  Karo Lakhani is a 83 y.o. female who presents on 2023 for evaluation of newly diagnosed left  breast cancer.     A detailed patient history was obtained and reviewed. She currently denies any breast issues including rashes, redness, pain, swelling, nipple discharge, or new lumps/masses.     MG breast density: Category B (scattered areas of fibroglandular tissue)      Imagin2023 BL DG MG/ L US BREAST LIMITED at [facility]- which revealed on L MG in the area of palpable concern at the 12 o'clock anterior to middle depth, there is a spiculated mass with associated heterogeneous calcifications which spans on the order of 5 cm mammographically and extends towards the nipple which appears mildly retracted. On R MG, there are no suspicious findings. On L US at 12 o'clock 4 cm FN,  there is an irregular hypoechoic mass with associated calcifications, spanning approximately 4.7 x 1.7 x 1.8 cm.  This demonstrates linear hypoechoic component extending toward the nipple as well as a presumed satellite nodule measuring 1.0 x 0.7 by 0.8 cm at the 11 o'clock  2 cm FN.  No pathologically enlarged lymph nodes visualized in the left axilla.  BIRADS-5 highly suspicious; biopsy recommended.      Pathology:  2023 Ultrasound-guided Core Needle Biopsy Left Breast  12 o'clock 4 cm FN-         - Invasive ductal carcinoma, grade 2 (measuring 1.5 cm)         - Focal ductal carcinoma in situ, grade 2 (measuring 1 mm)           ER 95%           NE 94%           HER 2 mj 2+/ FISH Negative           Ki67 27%    2. 2024 Left Mastectomy with Left [place] Lymph Node Biopsy which revealed invasive ductal carcinoma, grade 2 (measuring 3.8 cm) with focal microcalcifications.  Ductal carcinoma in situ, not extensive, grade 2 (measuring 4 cm)with solid to slightly cribriform architecture, focal comedonecrosis and focal microcalcifications. Multifocal lymphatic tumor invasion identified.  All margins clear.  One out of five lymph nodes positive for micrometastatic tumor.      OB/GYN History:  Age at Menarche Onset: 14  Menopausal Status: postmenopausal, LMP: No LMP recorded. Patient is postmenopausal.  Hysterectomy/Oophorectomy: menopause, at age 56  Hormonal birth control (duration): No none.   Pregnancy History:   Age at first live birth: 1  Hormone Replacement Therapy: No, none  Patient did not breast feed.  Patient denies nipple discharge.   Patient denies to previous breast biopsy.   Patient denies to a personal history of breast cancer.     Other Relevant History:  Prior thoracic RT: none  Genetic testing: No  Ashkenazi Anglican descent: No     Family History:  Family History   Problem Relation Name Age of Onset    Dementia Mother  80 - 89    Heart disease Mother      Colon cancer Father Dudley Lakhani 91    Cancer Father Dudley Lakhani         Colon Cancer    Throat cancer Maternal Grandmother      Skin cancer Maternal Grandmother      Cancer Sister Joy Greene         Carcinoma of the liver        Past History:  Past Medical History:   Diagnosis Date    Cancer 10/23        Past Surgical History:   Procedure Laterality Date    AXILLARY NODE DISSECTION Left 2024    Procedure: LYMPHADENECTOMY, AXILLARY - LEFT;  Surgeon: Dayami Escobar MD;  Location: [facility] OR;   Service: General;  Laterality: Left;    BREAST SURGERY  01/12/2024    COLONOSCOPY      FRACTURE SURGERY  10/2017    Hip Surgery    HIP FRACTURE SURGERY Left 10/2017    SENTINEL LYMPH NODE BIOPSY Left 01/12/2024    Procedure: BIOPSY, LYMPH NODE, SENTINEL - LEFT MagTrace Injected in office SentiMag needed No NUC MED;  Surgeon: Dayami Escobar MD;  Location: [facility] OR;  Service: General;  Laterality: Left;  MagTrace Injected in office  SentiMag needed  No NUC MED    SIMPLE MASTECTOMY Left 01/12/2024    Procedure: MASTECTOMY, SIMPLE - LEFT;  Surgeon: Dayami Escobar MD;  Location: [facility] OR;  Service: General;  Laterality: Left;        Social History     Socioeconomic History    Marital status: Single   Tobacco Use    Smoking status: Never     Passive exposure: Never    Smokeless tobacco: Never   Substance and Sexual Activity    Alcohol use: Never    Drug use: Never    Sexual activity: Never        Body mass index is 19.28 kg/m².     Allergy/Medications:   Review of patient's allergies indicates:  No Known Allergies       Current Outpatient Medications:     calcium carbonate-vitamin D3 600 mg-10 mcg (400 unit) Cap, Take by mouth once., Disp: , Rfl:     letrozole (FEMARA) 2.5 mg Tab, Take 1 tablet (2.5 mg total) by mouth once daily., Disp: 30 tablet, Rfl: 2  No current facility-administered medications for this visit.    Facility-Administered Medications Ordered in Other Visits:     HYDROcodone-acetaminophen 5-325 mg per tablet 1 tablet, 1 tablet, Oral, Q3H PRN, Carla Shearer MD    HYDROmorphone (PF) injection 0.4 mg, 0.4 mg, Intravenous, Q5 Min PRN, Carla Shearer MD, 0.2 mg at 01/12/24 1322    lactated ringers infusion, , Intravenous, Continuous, Carla Shearer MD, Last Rate: 10 mL/hr at 01/12/24 1350, New Bag at 01/12/24 1350    LIDOcaine (PF) 10 mg/ml (1%) injection 10 mg, 1 mL, Intradermal, Once, Carla Shearer MD    midazolam (VERSED) 1 mg/mL injection 2 mg, 2 mg, Intravenous, Once PRN,  "Carla Shearer MD    ondansetron injection 4 mg, 4 mg, Intravenous, Daily PRN, Carla Shearer MD    prochlorperazine injection Soln 5 mg, 5 mg, Intravenous, Q30 Min PRN, Carla Shearer MD       Review of Systems:  Review of Systems   All other systems reviewed and are negative.    Objective:     Vitals:  Blood pressure 116/74, pulse 62, temperature 97.7 °F (36.5 °C), temperature source Oral, resp. rate 18, height 5' 4.02" (1.626 m), weight 51 kg (112 lb 6.4 oz), SpO2 96%.    Physical Exam:  General: The patient is awake, alert and oriented times three. The patient is well nourished and in no acute distress.  Neck: There is no evidence of palpable cervical, supraclavicular or axillary adenopathy. The neck is supple. The thyroid is not enlarged.  Musculoskeletal: The patient has a normal range of motion of her bilateral upper extremities.  Chest: Examination of the chest wall fails to reveal any obvious abnormalities. Nonlabored breathing, symmetric expansion.  Breast:  Right:  Examination of right breast fails to reveal any dominant masses or areas of significant focal nodularity. The nipple is everted without evidence of discharge. There is no skin dimpling with movement of the pectoralis. There are no significant skin changes overlying the breast.   Left: Examination of the Left mastectomy fails to reveal any dominant masses or areas of significant focal nodularity. The nipple is surgically absent. There are no significant skin changes overlying the breasts. There is no skin dimpling with movement of the pectoralis.   Abdomen: The abdomen is soft, flat, nontender and nondistended.  Integumentary: no rashes or skin lesions present  Neurologic: cranial nerves intact, no signs of peripheral neurological deficit, motor/sensory function intact      Assessment and Plan:     Encounter Diagnoses   Name Primary?    Malignant neoplasm of overlapping sites of left breast in female, estrogen receptor positive " Yes    Status post left mastectomy     Status post lymph node biopsy           Plan:     Continue AI and follow up with oncology.    R SCR MG in November    RTC after MG for CBE. After this future follow up, she would like to follow up as needed to reduce number of doctors visits and transition all surveillance follow up to oncologist in [place]. I am in agreement with this plan and will discuss again assuming next CBE in November is benign/stable.    Healthy lifestyle guidelines were reviewed. She was encouraged to engage in regular exercise, maintain a healthy body weight, and avoid excessive alcohol consumption. Healthy nutritional guidelines were also discussed. Self-breast examination was reviewed with the patient in detail and she was encouraged to perform this on a monthly basis.       All of her questions were answered. She was advised to call if she develops any questions or concerns.    Mitali Jimenez PA-C         Total time on the date of the visit ranged from 30-39 mins (98022). Total time includes both face-to-face and non-face-to-face time personally spent by myself on the day of the visit.    Non-face-to-face time included:  _X_ preparing to see the patient such as reviewing the patient record  __ obtaining and reviewing separately obtained history  _X_ independently interpreting results  _X_ documenting clinical information in electronic health record.    Face-to-face time included:  _X_ performing an appropriate history and examination  _X_ communicating results to the patient  _X_ counseling and educating the patient  __ ordering appropriate medications  __ ordering appropriate tests  _X_ ordering appropriate procedures (including follow-up)  _X_ answering any questions the patient had    Total Time spent on date of visit: 35 minutes

## 2024-07-10 NOTE — PROGRESS NOTES
Subjective:       Patient ID: Karo Lakhani is a 83 y.o. female.    Chief Complaint: Follow up    Diagnosis: Left Breast Cancer - Hormone Positive    Current Treatment:   Letrozole 3/1/2024-present  Unable to obtain dental clearance for prolia. Fosamax started 7/11/2024-present     Treatment History:   Left Breast Lumpectomy with SLNBx - Dr. Escobar - 1/12/24    HPI:  84yo F presented in February '24 for evaluation of hormone positive left breast cancer. She felt a mass in her left breast in Fall '23 and underwent diagnostic imagine in November '23 which revealed at the 12 o'clock position 4 CMFN, an irregular hypoechoic mass with associated calcifications, spanning approximately 4.7 x 1.7 x 1.8 cm. There was also noted to be a linear hypoechoic component extending toward the nipple as well as a presumed satellite nodule measuring 1.0 x 0.7 by 0.8 cm at the 11 o'clock  2 CMFN.  No pathologically enlarged lymph nodes visualized in the left axilla. Biopsy of the mass revealed Invasive ductal Carcinoma, G2, ER 95%, WA 94%, Her2 2+, Fish Negative, Ki67 27%. She underwent left breast lumpectomy with Dr. Escobar on 1/12/24 with final pathology revealing a 3.8cm G2 IDC with approx 4cm of G2 DCIS as well. Of the 6 lymph nodes sampled, 1 returned positive for micrometastatic disease measuring 0.4mm. She had an oncotype done with a score of 15. Chemotherapy was not recommended given oncotype score. She was evaluated by radiation oncology who did not recommend adjuvant radiation. Plan is for adjuvant antihormonal therapy for at least 5 years.     Interval History:  She returns to the clinic today for a three month follow-up. She continues her letrozole without any side effects. She is unable to obtain dental clearance for prolia due to her tooth extractions being too expensive. Discussed with Dr. LeJeune, will plan for fosamax instead. Risks versus benefits discussed in detail with pt. She verbalized understanding and is  willing to proceed.  Mammogram currently ordered for 11/16/2024 and scheduled at breast center. She denies any breast complaints today. She denies any SOB, CP, headache, swelling, mood swings, vaginal dryness, joint pain, or hot flashes.       Past Medical History:   Diagnosis Date    Cancer 10/23      Past Surgical History:   Procedure Laterality Date    AXILLARY NODE DISSECTION Left 01/12/2024    Procedure: LYMPHADENECTOMY, AXILLARY - LEFT;  Surgeon: Dayami Escobar MD;  Location: [facility] OR;  Service: General;  Laterality: Left;    BREAST SURGERY  01/12/2024    COLONOSCOPY      FRACTURE SURGERY  10/2017    Hip Surgery    HIP FRACTURE SURGERY Left 10/2017    SENTINEL LYMPH NODE BIOPSY Left 01/12/2024    Procedure: BIOPSY, LYMPH NODE, SENTINEL - LEFT MagTrace Injected in office SentiMag needed No NUC MED;  Surgeon: Dayami Escobar MD;  Location: [facility] OR;  Service: General;  Laterality: Left;  MagTrace Injected in office  SentiMag needed  No NUC MED    SIMPLE MASTECTOMY Left 01/12/2024    Procedure: MASTECTOMY, SIMPLE - LEFT;  Surgeon: Dayami Escobar MD;  Location: [facility] OR;  Service: General;  Laterality: Left;     Social History     Socioeconomic History    Marital status: Single   Tobacco Use    Smoking status: Never     Passive exposure: Never    Smokeless tobacco: Never   Substance and Sexual Activity    Alcohol use: Never    Drug use: Never    Sexual activity: Never      Family History   Problem Relation Name Age of Onset    Dementia Mother  80 - 89    Heart disease Mother      Colon cancer Father Dudley Lakhani 91    Cancer Father Dudley Lakhani         Colon Cancer    Throat cancer Maternal Grandmother      Skin cancer Maternal Grandmother      Cancer Sister Joy Greene         Carcinoma of the liver      Review of patient's allergies indicates:  No Known Allergies   Review of Systems   Constitutional:  Negative for activity change, appetite change, fever and unexpected weight change.   HENT:   Negative for mouth sores and sore throat.    Eyes:  Negative for visual disturbance.   Respiratory:  Negative for cough and shortness of breath.    Cardiovascular:  Negative for chest pain.   Gastrointestinal:  Negative for abdominal pain, diarrhea, nausea and vomiting.   Endocrine: Negative for polyuria.   Genitourinary:  Negative for dysuria, frequency and hot flashes.   Musculoskeletal:  Negative for back pain.   Integumentary:  Negative for rash.   Neurological:  Negative for weakness and headaches.   Hematological:  Negative for adenopathy.   Psychiatric/Behavioral:  Negative for confusion. The patient is not nervous/anxious.          Objective:      Vitals:    07/11/24 1000   BP: 116/70   Pulse: 74   Resp: 20   Temp: 98.4 °F (36.9 °C)           Physical Exam  Constitutional:       General: She is not in acute distress.     Appearance: Normal appearance. She is not ill-appearing.   HENT:      Head: Normocephalic and atraumatic.      Nose: Nose normal.      Mouth/Throat:      Mouth: Mucous membranes are moist.      Pharynx: Oropharynx is clear.   Eyes:      Extraocular Movements: Extraocular movements intact.      Conjunctiva/sclera: Conjunctivae normal.      Pupils: Pupils are equal, round, and reactive to light.   Cardiovascular:      Rate and Rhythm: Normal rate and regular rhythm.      Pulses: Normal pulses.      Heart sounds: Normal heart sounds. No murmur heard.  Pulmonary:      Effort: Pulmonary effort is normal. No respiratory distress.      Breath sounds: Normal breath sounds.   Chest:      Comments: Well healed left breast mastectomy with scar tissue noted to outer area of incision.  No adenopathy, masses, rashes, or drainage noted bilaterally.   Abdominal:      General: There is no distension.      Palpations: Abdomen is soft.      Tenderness: There is no abdominal tenderness.   Musculoskeletal:         General: Normal range of motion.      Cervical back: Normal range of motion and neck supple.       Right lower leg: No edema.      Left lower leg: No edema.   Lymphadenopathy:      Cervical: No cervical adenopathy.   Skin:     General: Skin is warm and dry.   Neurological:      General: No focal deficit present.      Mental Status: She is alert and oriented to person, place, and time.         LABS AND IMAGING REVIEWED IN EPIC  11/16/2023 BL DG MG/ L US BREAST LIMITED at [facility]- which revealed on L MG in the area of palpable concern at the 12 o'clock anterior to middle depth, there is a spiculated mass with associated heterogeneous calcifications which spans on the order of 5 cm mammographically and extends towards the nipple which appears mildly retracted. On R MG, there are no suspicious findings. On L US at 12 o'clock 4 cm FN,  there is an irregular hypoechoic mass with associated calcifications, spanning approximately 4.7 x 1.7 x 1.8 cm.  This demonstrates linear hypoechoic component extending toward the nipple as well as a presumed satellite nodule measuring 1.0 x 0.7 by 0.8 cm at the 11 o'clock  2 cm FN.  No pathologically enlarged lymph nodes visualized in the left axilla.  BIRADS-5 highly suspicious; biopsy recommended.  2/29/24 DEXA:  1. Osteoporosis of the lumbar spine  2. Osteopenia of the distal left radius  3. Osteoporosis of the right hip    Pathology:    12/6/23 Left Breast Biopsy:  FINAL DIAGNOSIS    LEFT BREAST BIOPSY 12 O'CLOCK 4 CMFN:    INVASIVE DUCTAL CARCINOMA, NOS TYPE, AT LEAST 1.5 CM GREATEST DIMENSION.    FOCAL DUCTAL CARCINOMA IN SITU, COMEDO TYPE, NUCLEAR GRADE 2, 1 MM GREATEST   EXTENT.    ADAM HISTOLOGIC SCORE:   -- TUBULE FORMATION: SCORE 2   -- NUCLEAR PLEOMORPHISM: SCORE 2   -- MITOTIC ACTIVITY: SCORE 2   -- OVERALL SCORE: 6/9 (GRADE 2).   ER/SD+, HER2-2+, Ki-67 27%    1/12/24 Left Lumpectomy:  FINAL DIAGNOSIS    1. LEFT BREAST (SIMPLE MASTECTOMY):    A) INVASIVE DUCTAL CARCINOMA (3.8 CM), GRADE 2 (2+2+2) WITH FOCAL   MICROCALCIFICATIONS AND FOCAL ORGANIZING PREVIOUS BIOPSY  SITE.    B) DUCTAL CARCINOMA IN SITU, NOT EXTENSIVE (APPROXIMATELY 4 CM), GRADE 2, WITH   SOLID TO SLIGHTLY CRIBRIFORM ARCHITECTURE, FOCAL COMEDO NECROSIS AND FOCAL   MICROCALCIFICATIONS.    C) MULTIFOCAL LYMPHATIC TUMOR INVASION IDENTIFIED.    D) RESECTION MARGIN CLEAR (INVASIVE AND IN SITU TUMOR CLOSEST TO POSTERIOR   MARGIN AT 2.1 CM).    E) FATTY BREAST TISSUE WITH FOCAL NONPROLIFERATIVE FIBROCYSTIC CHANGES WITH   FOCAL MICROCALCIFICATIONS.    F) ONE LYMPH NODE NEGATIVE FOR METASTATIC TUMOR (0/1).    2. LYMPH NODE, LEFT AXILLARY SENTINEL #1 (EXCISION):    ONE LYMPH NODE POSITIVE FOR MICROMETASTATIC TUMOR (1/1).    3. LYMPH NODE, LEFT AXILLARY SENTINEL #2 (EXCISION):    ONE LYMPH NODE NEGATIVE FOR METASTATIC TUMOR (0/1).    4. LYMPH NODE, LEFT AXILLARY SENTINEL #3 (EXCISION):    TWO LYMPH NODES NEGATIVE FOR METASTATIC TUMOR (0/2).    5. LYMPH NODE, LEFT AXILLARY SENTINEL #4 (EXCISION):    ONE LYMPH NODE NEGATIVE FOR METASTATIC TUMOR (0/1).   [id number]; oncotype- 15    Assessment:   Stage 1B Left Breast Cancer - ER+/AL+/Her2 2+ FISH negative - s/p mastectomy 1/24. T2N1mi. Oncotype 15. No role for chemotherapy. Deferred axillary XRT due to age and micromet disease. Plan for AI x 5 years.   Letrozole started 3/1/2024.  Dexa 2/24   Osteoporosis noted, unable to get dental clearance to begin Prolia due to cost. Will start Fosamax and monitor closely.   repeat due 2/2026  Continue calcium and vitamin D supplement daily, weight bearing exercise was encouraged.           Plan:   Labs stable.  Continue with letrozole 2.5 mg daily, refill sent  Fosamax sent to pharmacy. She was instructed on how to take.   Continue calcium and vitamin D supplement  Follow-up with breast surgery 4/24/2024.  Right screening mammogram due 11/16/2024, ordered by breast surgery  DEXA due 2/2026.  RTC in 3 months with NP for FU/lab      I spent a total of 40 minutes on the day of the visit.This includes face to face time and non-face to face time  preparing to see the patient (eg, review of tests), obtaining and/or reviewing separately obtained history, documenting clinical information in the electronic or other health record, independently interpreting results and communicating results to the patient/family/caregiver, or care coordinator.    Jeane Benavides, CRISTYP-C  Oncology/Hematology   [facility]

## 2024-07-11 ENCOUNTER — LAB VISIT (OUTPATIENT)
Dept: LAB | Facility: HOSPITAL | Age: 84
End: 2024-07-11
Payer: MEDICARE

## 2024-07-11 ENCOUNTER — OFFICE VISIT (OUTPATIENT)
Dept: HEMATOLOGY/ONCOLOGY | Facility: CLINIC | Age: 84
End: 2024-07-11
Payer: MEDICARE

## 2024-07-11 VITALS
TEMPERATURE: 98 F | HEIGHT: 64 IN | SYSTOLIC BLOOD PRESSURE: 116 MMHG | OXYGEN SATURATION: 98 % | RESPIRATION RATE: 20 BRPM | HEART RATE: 74 BPM | DIASTOLIC BLOOD PRESSURE: 70 MMHG | BODY MASS INDEX: 19.14 KG/M2 | WEIGHT: 112.13 LBS

## 2024-07-11 DIAGNOSIS — C50.812 MALIGNANT NEOPLASM OF OVERLAPPING SITES OF LEFT BREAST IN FEMALE, ESTROGEN RECEPTOR POSITIVE: ICD-10-CM

## 2024-07-11 DIAGNOSIS — M81.0 OSTEOPOROSIS, UNSPECIFIED OSTEOPOROSIS TYPE, UNSPECIFIED PATHOLOGICAL FRACTURE PRESENCE: ICD-10-CM

## 2024-07-11 DIAGNOSIS — Z98.890 STATUS POST LYMPH NODE BIOPSY: ICD-10-CM

## 2024-07-11 DIAGNOSIS — Z79.811 LONG TERM CURRENT USE OF AROMATASE INHIBITOR: ICD-10-CM

## 2024-07-11 DIAGNOSIS — C50.812 MALIGNANT NEOPLASM OF OVERLAPPING SITES OF LEFT BREAST IN FEMALE, ESTROGEN RECEPTOR POSITIVE: Primary | ICD-10-CM

## 2024-07-11 DIAGNOSIS — Z79.811 USE OF LETROZOLE (FEMARA): ICD-10-CM

## 2024-07-11 DIAGNOSIS — Z17.0 MALIGNANT NEOPLASM OF OVERLAPPING SITES OF LEFT BREAST IN FEMALE, ESTROGEN RECEPTOR POSITIVE: Primary | ICD-10-CM

## 2024-07-11 DIAGNOSIS — Z79.811 LONG TERM (CURRENT) USE OF AROMATASE INHIBITORS: ICD-10-CM

## 2024-07-11 DIAGNOSIS — M81.0 AGE-RELATED OSTEOPOROSIS WITHOUT CURRENT PATHOLOGICAL FRACTURE: ICD-10-CM

## 2024-07-11 DIAGNOSIS — Z90.12 STATUS POST LEFT MASTECTOMY: ICD-10-CM

## 2024-07-11 DIAGNOSIS — Z17.0 MALIGNANT NEOPLASM OF OVERLAPPING SITES OF LEFT BREAST IN FEMALE, ESTROGEN RECEPTOR POSITIVE: ICD-10-CM

## 2024-07-11 LAB
ALBUMIN SERPL-MCNC: 3.4 G/DL (ref 3.4–4.8)
ALBUMIN/GLOB SERPL: 1.1 RATIO (ref 1.1–2)
ALP SERPL-CCNC: 65 UNIT/L (ref 40–150)
ALT SERPL-CCNC: 8 UNIT/L (ref 0–55)
ANION GAP SERPL CALC-SCNC: 8 MEQ/L
AST SERPL-CCNC: 17 UNIT/L (ref 5–34)
BASOPHILS # BLD AUTO: 0.06 X10(3)/MCL
BASOPHILS NFR BLD AUTO: 0.5 %
BILIRUB SERPL-MCNC: 0.7 MG/DL
BUN SERPL-MCNC: 14 MG/DL (ref 9.8–20.1)
CALCIUM SERPL-MCNC: 9.9 MG/DL (ref 8.4–10.2)
CHLORIDE SERPL-SCNC: 106 MMOL/L (ref 98–107)
CO2 SERPL-SCNC: 27 MMOL/L (ref 23–31)
CREAT SERPL-MCNC: 0.99 MG/DL (ref 0.55–1.02)
CREAT/UREA NIT SERPL: 14
EOSINOPHIL # BLD AUTO: 0.14 X10(3)/MCL (ref 0–0.9)
EOSINOPHIL NFR BLD AUTO: 1.1 %
ERYTHROCYTE [DISTWIDTH] IN BLOOD BY AUTOMATED COUNT: 13.4 % (ref 11.5–17)
GFR SERPLBLD CREATININE-BSD FMLA CKD-EPI: 57 ML/MIN/1.73/M2
GLOBULIN SER-MCNC: 3 GM/DL (ref 2.4–3.5)
GLUCOSE SERPL-MCNC: 118 MG/DL (ref 82–115)
HCT VFR BLD AUTO: 38.9 % (ref 37–47)
HGB BLD-MCNC: 12.5 G/DL (ref 12–16)
IMM GRANULOCYTES # BLD AUTO: 0.05 X10(3)/MCL (ref 0–0.04)
IMM GRANULOCYTES NFR BLD AUTO: 0.4 %
LYMPHOCYTES # BLD AUTO: 1.64 X10(3)/MCL (ref 0.6–4.6)
LYMPHOCYTES NFR BLD AUTO: 12.7 %
MCH RBC QN AUTO: 29.4 PG (ref 27–31)
MCHC RBC AUTO-ENTMCNC: 32.1 G/DL (ref 33–36)
MCV RBC AUTO: 91.5 FL (ref 80–94)
MONOCYTES # BLD AUTO: 1.05 X10(3)/MCL (ref 0.1–1.3)
MONOCYTES NFR BLD AUTO: 8.2 %
NEUTROPHILS # BLD AUTO: 9.94 X10(3)/MCL (ref 2.1–9.2)
NEUTROPHILS NFR BLD AUTO: 77.1 %
PLATELET # BLD AUTO: 263 X10(3)/MCL (ref 130–400)
PMV BLD AUTO: 9.9 FL (ref 7.4–10.4)
POTASSIUM SERPL-SCNC: 3.8 MMOL/L (ref 3.5–5.1)
PROT SERPL-MCNC: 6.4 GM/DL (ref 5.8–7.6)
RBC # BLD AUTO: 4.25 X10(6)/MCL (ref 4.2–5.4)
SODIUM SERPL-SCNC: 141 MMOL/L (ref 136–145)
WBC # BLD AUTO: 12.88 X10(3)/MCL (ref 4.5–11.5)

## 2024-07-11 PROCEDURE — 80053 COMPREHEN METABOLIC PANEL: CPT

## 2024-07-11 PROCEDURE — 99214 OFFICE O/P EST MOD 30 MIN: CPT | Mod: PBBFAC

## 2024-07-11 PROCEDURE — 85025 COMPLETE CBC W/AUTO DIFF WBC: CPT

## 2024-07-11 PROCEDURE — 36415 COLL VENOUS BLD VENIPUNCTURE: CPT

## 2024-07-11 PROCEDURE — 99999 PR PBB SHADOW E&M-EST. PATIENT-LVL IV: CPT | Mod: PBBFAC,,,

## 2024-07-11 RX ORDER — LETROZOLE 2.5 MG/1
2.5 TABLET, FILM COATED ORAL DAILY
Qty: 30 TABLET | Refills: 2 | Status: SHIPPED | OUTPATIENT
Start: 2024-07-11 | End: 2025-07-11

## 2024-07-11 RX ORDER — ALENDRONATE SODIUM 70 MG/1
70 TABLET ORAL
Qty: 4 TABLET | Refills: 11 | Status: SHIPPED | OUTPATIENT
Start: 2024-07-11 | End: 2025-07-11

## 2024-10-14 DIAGNOSIS — C50.812 MALIGNANT NEOPLASM OF OVERLAPPING SITES OF LEFT BREAST IN FEMALE, ESTROGEN RECEPTOR POSITIVE: Primary | ICD-10-CM

## 2024-10-14 DIAGNOSIS — Z17.0 MALIGNANT NEOPLASM OF OVERLAPPING SITES OF LEFT BREAST IN FEMALE, ESTROGEN RECEPTOR POSITIVE: Primary | ICD-10-CM

## 2024-10-24 DIAGNOSIS — Z17.0 MALIGNANT NEOPLASM OF OVERLAPPING SITES OF LEFT BREAST IN FEMALE, ESTROGEN RECEPTOR POSITIVE: ICD-10-CM

## 2024-10-24 DIAGNOSIS — C50.812 MALIGNANT NEOPLASM OF OVERLAPPING SITES OF LEFT BREAST IN FEMALE, ESTROGEN RECEPTOR POSITIVE: ICD-10-CM

## 2024-10-24 RX ORDER — LETROZOLE 2.5 MG/1
2.5 TABLET, FILM COATED ORAL
Qty: 30 TABLET | Refills: 2 | Status: SHIPPED | OUTPATIENT
Start: 2024-10-24 | End: 2024-10-25 | Stop reason: SDUPTHER

## 2024-10-25 ENCOUNTER — LAB VISIT (OUTPATIENT)
Dept: LAB | Facility: HOSPITAL | Age: 84
End: 2024-10-25
Payer: MEDICARE

## 2024-10-25 ENCOUNTER — OFFICE VISIT (OUTPATIENT)
Dept: HEMATOLOGY/ONCOLOGY | Facility: CLINIC | Age: 84
End: 2024-10-25
Payer: MEDICARE

## 2024-10-25 VITALS
SYSTOLIC BLOOD PRESSURE: 150 MMHG | HEART RATE: 58 BPM | OXYGEN SATURATION: 97 % | WEIGHT: 113.88 LBS | RESPIRATION RATE: 16 BRPM | HEIGHT: 64 IN | DIASTOLIC BLOOD PRESSURE: 80 MMHG | TEMPERATURE: 98 F | BODY MASS INDEX: 19.44 KG/M2

## 2024-10-25 DIAGNOSIS — M81.0 OSTEOPOROSIS, UNSPECIFIED OSTEOPOROSIS TYPE, UNSPECIFIED PATHOLOGICAL FRACTURE PRESENCE: Primary | ICD-10-CM

## 2024-10-25 DIAGNOSIS — C50.812 MALIGNANT NEOPLASM OF OVERLAPPING SITES OF LEFT BREAST IN FEMALE, ESTROGEN RECEPTOR POSITIVE: ICD-10-CM

## 2024-10-25 DIAGNOSIS — Z17.0 MALIGNANT NEOPLASM OF OVERLAPPING SITES OF LEFT BREAST IN FEMALE, ESTROGEN RECEPTOR POSITIVE: ICD-10-CM

## 2024-10-25 DIAGNOSIS — Z90.12 STATUS POST LEFT MASTECTOMY: ICD-10-CM

## 2024-10-25 DIAGNOSIS — Z79.811 LONG TERM (CURRENT) USE OF AROMATASE INHIBITORS: ICD-10-CM

## 2024-10-25 LAB
ALBUMIN SERPL-MCNC: 3.9 G/DL (ref 3.4–4.8)
ALBUMIN/GLOB SERPL: 1.6 RATIO (ref 1.1–2)
ALP SERPL-CCNC: 57 UNIT/L (ref 40–150)
ALT SERPL-CCNC: 14 UNIT/L (ref 0–55)
ANION GAP SERPL CALC-SCNC: 8 MEQ/L
AST SERPL-CCNC: 22 UNIT/L (ref 5–34)
BASOPHILS # BLD AUTO: 0.05 X10(3)/MCL
BASOPHILS NFR BLD AUTO: 0.8 %
BILIRUB SERPL-MCNC: 0.5 MG/DL
BUN SERPL-MCNC: 13 MG/DL (ref 9.8–20.1)
CALCIUM SERPL-MCNC: 10.4 MG/DL (ref 8.4–10.2)
CHLORIDE SERPL-SCNC: 106 MMOL/L (ref 98–107)
CO2 SERPL-SCNC: 28 MMOL/L (ref 23–31)
CREAT SERPL-MCNC: 0.91 MG/DL (ref 0.55–1.02)
CREAT/UREA NIT SERPL: 14
EOSINOPHIL # BLD AUTO: 0.08 X10(3)/MCL (ref 0–0.9)
EOSINOPHIL NFR BLD AUTO: 1.3 %
ERYTHROCYTE [DISTWIDTH] IN BLOOD BY AUTOMATED COUNT: 13.2 % (ref 11.5–17)
GFR SERPLBLD CREATININE-BSD FMLA CKD-EPI: >60 ML/MIN/1.73/M2
GLOBULIN SER-MCNC: 2.4 GM/DL (ref 2.4–3.5)
GLUCOSE SERPL-MCNC: 113 MG/DL (ref 82–115)
HCT VFR BLD AUTO: 43.3 % (ref 37–47)
HGB BLD-MCNC: 14.2 G/DL (ref 12–16)
IMM GRANULOCYTES # BLD AUTO: 0.01 X10(3)/MCL (ref 0–0.04)
IMM GRANULOCYTES NFR BLD AUTO: 0.2 %
LYMPHOCYTES # BLD AUTO: 1.88 X10(3)/MCL (ref 0.6–4.6)
LYMPHOCYTES NFR BLD AUTO: 29.4 %
MCH RBC QN AUTO: 29.3 PG (ref 27–31)
MCHC RBC AUTO-ENTMCNC: 32.8 G/DL (ref 33–36)
MCV RBC AUTO: 89.5 FL (ref 80–94)
MONOCYTES # BLD AUTO: 0.57 X10(3)/MCL (ref 0.1–1.3)
MONOCYTES NFR BLD AUTO: 8.9 %
NEUTROPHILS # BLD AUTO: 3.81 X10(3)/MCL (ref 2.1–9.2)
NEUTROPHILS NFR BLD AUTO: 59.4 %
PLATELET # BLD AUTO: 230 X10(3)/MCL (ref 130–400)
PMV BLD AUTO: 9.7 FL (ref 7.4–10.4)
POTASSIUM SERPL-SCNC: 3.8 MMOL/L (ref 3.5–5.1)
PROT SERPL-MCNC: 6.3 GM/DL (ref 5.8–7.6)
RBC # BLD AUTO: 4.84 X10(6)/MCL (ref 4.2–5.4)
SODIUM SERPL-SCNC: 142 MMOL/L (ref 136–145)
WBC # BLD AUTO: 6.4 X10(3)/MCL (ref 4.5–11.5)

## 2024-10-25 PROCEDURE — 99213 OFFICE O/P EST LOW 20 MIN: CPT | Mod: PBBFAC

## 2024-10-25 PROCEDURE — 80053 COMPREHEN METABOLIC PANEL: CPT

## 2024-10-25 PROCEDURE — 85025 COMPLETE CBC W/AUTO DIFF WBC: CPT

## 2024-10-25 PROCEDURE — 99999 PR PBB SHADOW E&M-EST. PATIENT-LVL III: CPT | Mod: PBBFAC,,,

## 2024-10-25 PROCEDURE — 36415 COLL VENOUS BLD VENIPUNCTURE: CPT

## 2024-10-25 RX ORDER — LETROZOLE 2.5 MG/1
2.5 TABLET, FILM COATED ORAL DAILY
Qty: 30 TABLET | Refills: 2 | Status: SHIPPED | OUTPATIENT
Start: 2024-10-25 | End: 2025-01-23

## 2024-10-25 NOTE — PROGRESS NOTES
Subjective:       Patient ID: Karo Lakhani is a 83 y.o. female.    Chief Complaint: Follow up    Diagnosis: Left Breast Cancer - Hormone Positive    Current Treatment:   Letrozole 3/1/2024-present  Unable to obtain dental clearance for prolia. Fosamax started 7/11/2024-present     Treatment History:   Left Breast Lumpectomy with SLNBx - Dr. Escobar - 1/12/24    HPI:  82yo F presented in February '24 for evaluation of hormone positive left breast cancer. She felt a mass in her left breast in Fall '23 and underwent diagnostic imagine in November '23 which revealed at the 12 o'clock position 4 CMFN, an irregular hypoechoic mass with associated calcifications, spanning approximately 4.7 x 1.7 x 1.8 cm. There was also noted to be a linear hypoechoic component extending toward the nipple as well as a presumed satellite nodule measuring 1.0 x 0.7 by 0.8 cm at the 11 o'clock  2 CMFN.  No pathologically enlarged lymph nodes visualized in the left axilla. Biopsy of the mass revealed Invasive ductal Carcinoma, G2, ER 95%, MI 94%, Her2 2+, Fish Negative, Ki67 27%. She underwent left breast lumpectomy with Dr. Escobar on 1/12/24 with final pathology revealing a 3.8cm G2 IDC with approx 4cm of G2 DCIS as well. Of the 6 lymph nodes sampled, 1 returned positive for micrometastatic disease measuring 0.4mm. She had an oncotype done with a score of 15. Chemotherapy was not recommended given oncotype score. She was evaluated by radiation oncology who did not recommend adjuvant radiation. Plan is for adjuvant antihormonal therapy for at least 5 years.     Interval History:  She returns to the clinic today for a three month follow-up. She continues her letrozole without any side effects. She continues on fosamax weekly. Mammogram currently ordered for 11/21/2024 and scheduled at [facility]. She denies any breast complaints today. She denies any SOB, CP, headache, swelling, mood swings, vaginal dryness, joint pain, or hot flashes. She  does not a small nodule to her right wrist that she noticed about 2-3 weeks ago, denies any pain or recent trauma. Instructed to monitor and follow up with PCP for worsening symptoms.       Past Medical History:   Diagnosis Date    Cancer 10/23      Past Surgical History:   Procedure Laterality Date    AXILLARY NODE DISSECTION Left 01/12/2024    Procedure: LYMPHADENECTOMY, AXILLARY - LEFT;  Surgeon: Dayami Escobar MD;  Location: [facility] OR;  Service: General;  Laterality: Left;    BREAST SURGERY  01/12/2024    COLONOSCOPY      FRACTURE SURGERY  10/2017    Hip Surgery    HIP FRACTURE SURGERY Left 10/2017    SENTINEL LYMPH NODE BIOPSY Left 01/12/2024    Procedure: BIOPSY, LYMPH NODE, SENTINEL - LEFT MagTrace Injected in office SentiMag needed No NUC MED;  Surgeon: Dayami Escobar MD;  Location: [facility] OR;  Service: General;  Laterality: Left;  MagTrace Injected in office  SentiMag needed  No NUC MED    SIMPLE MASTECTOMY Left 01/12/2024    Procedure: MASTECTOMY, SIMPLE - LEFT;  Surgeon: Dayami Escobar MD;  Location: [facility] OR;  Service: General;  Laterality: Left;     Social History     Socioeconomic History    Marital status: Single   Tobacco Use    Smoking status: Never     Passive exposure: Never    Smokeless tobacco: Never   Substance and Sexual Activity    Alcohol use: Never    Drug use: Never    Sexual activity: Never      Family History   Problem Relation Name Age of Onset    Dementia Mother  80 - 89    Heart disease Mother      Colon cancer Father Dudley Lakhani 91    Cancer Father Dudley Lakhani         Colon Cancer    Throat cancer Maternal Grandmother      Skin cancer Maternal Grandmother      Cancer Sister Joy Greene         Carcinoma of the liver      Review of patient's allergies indicates:  No Known Allergies   Review of Systems   Constitutional:  Negative for activity change, appetite change, fever and unexpected weight change.   HENT:  Negative for mouth sores and sore throat.    Eyes:   Negative for visual disturbance.   Respiratory:  Negative for cough and shortness of breath.    Cardiovascular:  Negative for chest pain.   Gastrointestinal:  Negative for abdominal pain, diarrhea, nausea and vomiting.   Endocrine: Negative for polyuria.   Genitourinary:  Negative for dysuria, frequency and hot flashes.   Musculoskeletal:  Negative for back pain.   Integumentary:  Negative for rash.   Allergic/Immunologic: Negative for immunocompromised state.   Neurological:  Negative for weakness and headaches.   Hematological:  Negative for adenopathy.   Psychiatric/Behavioral:  Negative for confusion. The patient is not nervous/anxious.          Objective:      Vitals:    10/25/24 0816   BP: (!) 150/80   Pulse: (!) 58   Resp: 16   Temp: 97.8 °F (36.6 °C)           Physical Exam  Constitutional:       General: She is not in acute distress.     Appearance: Normal appearance. She is not ill-appearing.   HENT:      Head: Normocephalic and atraumatic.      Nose: Nose normal.      Mouth/Throat:      Mouth: Mucous membranes are moist.      Pharynx: Oropharynx is clear.   Eyes:      Extraocular Movements: Extraocular movements intact.      Conjunctiva/sclera: Conjunctivae normal.      Pupils: Pupils are equal, round, and reactive to light.   Cardiovascular:      Rate and Rhythm: Normal rate and regular rhythm.      Pulses: Normal pulses.      Heart sounds: Normal heart sounds. No murmur heard.  Pulmonary:      Effort: Pulmonary effort is normal. No respiratory distress.      Breath sounds: Normal breath sounds.   Chest:      Comments: Well healed left breast mastectomy with scar tissue noted to outer area of incision.  No adenopathy, masses, rashes, or drainage noted bilaterally.   Abdominal:      General: There is no distension.      Palpations: Abdomen is soft.      Tenderness: There is no abdominal tenderness.   Musculoskeletal:         General: Normal range of motion.      Cervical back: Normal range of motion and  neck supple.      Right lower leg: No edema.      Left lower leg: No edema.   Lymphadenopathy:      Cervical: No cervical adenopathy.      Upper Body:      Right upper body: No supraclavicular or axillary adenopathy.      Left upper body: No supraclavicular or axillary adenopathy.   Skin:     General: Skin is warm and dry.   Neurological:      General: No focal deficit present.      Mental Status: She is alert and oriented to person, place, and time.         LABS AND IMAGING REVIEWED IN EPIC  11/16/2023 BL DG MG/ L US BREAST LIMITED at [facility]- which revealed on L MG in the area of palpable concern at the 12 o'clock anterior to middle depth, there is a spiculated mass with associated heterogeneous calcifications which spans on the order of 5 cm mammographically and extends towards the nipple which appears mildly retracted. On R MG, there are no suspicious findings. On L US at 12 o'clock 4 cm FN,  there is an irregular hypoechoic mass with associated calcifications, spanning approximately 4.7 x 1.7 x 1.8 cm.  This demonstrates linear hypoechoic component extending toward the nipple as well as a presumed satellite nodule measuring 1.0 x 0.7 by 0.8 cm at the 11 o'clock  2 cm FN.  No pathologically enlarged lymph nodes visualized in the left axilla.  BIRADS-5 highly suspicious; biopsy recommended.  2/29/24 DEXA:  1. Osteoporosis of the lumbar spine  2. Osteopenia of the distal left radius  3. Osteoporosis of the right hip    Pathology:    12/6/23 Left Breast Biopsy:  FINAL DIAGNOSIS    LEFT BREAST BIOPSY 12 O'CLOCK 4 CMFN:    INVASIVE DUCTAL CARCINOMA, NOS TYPE, AT LEAST 1.5 CM GREATEST DIMENSION.    FOCAL DUCTAL CARCINOMA IN SITU, COMEDO TYPE, NUCLEAR GRADE 2, 1 MM GREATEST   EXTENT.    ADAM HISTOLOGIC SCORE:   -- TUBULE FORMATION: SCORE 2   -- NUCLEAR PLEOMORPHISM: SCORE 2   -- MITOTIC ACTIVITY: SCORE 2   -- OVERALL SCORE: 6/9 (GRADE 2).   ER/VT+, HER2-2+, Ki-67 27%    1/12/24 Left Lumpectomy:  FINAL DIAGNOSIS     1. LEFT BREAST (SIMPLE MASTECTOMY):    A) INVASIVE DUCTAL CARCINOMA (3.8 CM), GRADE 2 (2+2+2) WITH FOCAL   MICROCALCIFICATIONS AND FOCAL ORGANIZING PREVIOUS BIOPSY SITE.    B) DUCTAL CARCINOMA IN SITU, NOT EXTENSIVE (APPROXIMATELY 4 CM), GRADE 2, WITH   SOLID TO SLIGHTLY CRIBRIFORM ARCHITECTURE, FOCAL COMEDO NECROSIS AND FOCAL   MICROCALCIFICATIONS.    C) MULTIFOCAL LYMPHATIC TUMOR INVASION IDENTIFIED.    D) RESECTION MARGIN CLEAR (INVASIVE AND IN SITU TUMOR CLOSEST TO POSTERIOR   MARGIN AT 2.1 CM).    E) FATTY BREAST TISSUE WITH FOCAL NONPROLIFERATIVE FIBROCYSTIC CHANGES WITH   FOCAL MICROCALCIFICATIONS.    F) ONE LYMPH NODE NEGATIVE FOR METASTATIC TUMOR (0/1).    2. LYMPH NODE, LEFT AXILLARY SENTINEL #1 (EXCISION):    ONE LYMPH NODE POSITIVE FOR MICROMETASTATIC TUMOR (1/1).    3. LYMPH NODE, LEFT AXILLARY SENTINEL #2 (EXCISION):    ONE LYMPH NODE NEGATIVE FOR METASTATIC TUMOR (0/1).    4. LYMPH NODE, LEFT AXILLARY SENTINEL #3 (EXCISION):    TWO LYMPH NODES NEGATIVE FOR METASTATIC TUMOR (0/2).    5. LYMPH NODE, LEFT AXILLARY SENTINEL #4 (EXCISION):    ONE LYMPH NODE NEGATIVE FOR METASTATIC TUMOR (0/1).   [id number]; oncotype- 15    Assessment:   Stage 1B Left Breast Cancer - ER+/NC+/Her2 2+ FISH negative - s/p mastectomy 1/24. T2N1mi. Oncotype 15. No role for chemotherapy. Deferred axillary XRT due to age and micromet disease. Plan for AI x 5 years.   Letrozole started 3/1/2024.  Dexa 2/24   Osteoporosis noted, unable to get dental clearance to begin Prolia due to cost. Will start Fosamax and monitor closely.   repeat due 2/2026  Continue calcium and vitamin D supplement daily, weight bearing exercise was encouraged.           Plan:   Labs stable.  Continue with letrozole 2.5 mg daily, refill sent  Continue fosamax weekly  Continue calcium and vitamin D supplement, decrease to once a day  Follow-up with breast surgery 12/4/2024.  Right screening mammogram due 11/21/2024, ordered by breast surgery  DEXA due  2/2026.  RTC in 3 months with NP for FU/lab  CBC CMP     I spent a total of 40 minutes on the day of the visit.This includes face to face time and non-face to face time preparing to see the patient (eg, review of tests), obtaining and/or reviewing separately obtained history, documenting clinical information in the electronic or other health record, independently interpreting results and communicating results to the patient/family/caregiver, or care coordinator.    Dee Alex, CRISTYP-C  Oncology/Hematology  [facility]

## 2024-11-21 ENCOUNTER — HOSPITAL ENCOUNTER (OUTPATIENT)
Dept: RADIOLOGY | Facility: HOSPITAL | Age: 84
Discharge: HOME | End: 2024-11-21
Attending: SURGERY
Payer: MEDICARE

## 2024-11-21 DIAGNOSIS — Z12.31 ENCOUNTER FOR SCREENING MAMMOGRAM FOR BREAST CANCER: ICD-10-CM

## 2024-11-21 PROCEDURE — 77067 SCR MAMMO BI INCL CAD: CPT | Mod: TC,52

## 2024-12-04 ENCOUNTER — OFFICE VISIT (OUTPATIENT)
Dept: SURGERY | Facility: CLINIC | Age: 84
End: 2024-12-04
Payer: MEDICARE

## 2024-12-04 VITALS
BODY MASS INDEX: 21.34 KG/M2 | HEART RATE: 63 BPM | OXYGEN SATURATION: 99 % | WEIGHT: 125 LBS | RESPIRATION RATE: 18 BRPM | SYSTOLIC BLOOD PRESSURE: 169 MMHG | HEIGHT: 64 IN | DIASTOLIC BLOOD PRESSURE: 72 MMHG | TEMPERATURE: 98 F

## 2024-12-04 DIAGNOSIS — Z17.0 MALIGNANT NEOPLASM OF OVERLAPPING SITES OF LEFT BREAST IN FEMALE, ESTROGEN RECEPTOR POSITIVE: Primary | ICD-10-CM

## 2024-12-04 DIAGNOSIS — Z98.890 STATUS POST LYMPH NODE BIOPSY: ICD-10-CM

## 2024-12-04 DIAGNOSIS — C50.812 MALIGNANT NEOPLASM OF OVERLAPPING SITES OF LEFT BREAST IN FEMALE, ESTROGEN RECEPTOR POSITIVE: Primary | ICD-10-CM

## 2024-12-04 DIAGNOSIS — Z90.12 STATUS POST LEFT MASTECTOMY: ICD-10-CM

## 2024-12-04 PROCEDURE — 99999 PR PBB SHADOW E&M-EST. PATIENT-LVL V: CPT | Mod: PBBFAC,,, | Performed by: PHYSICIAN ASSISTANT

## 2024-12-04 PROCEDURE — 99213 OFFICE O/P EST LOW 20 MIN: CPT | Mod: S$PBB,,, | Performed by: PHYSICIAN ASSISTANT

## 2024-12-04 PROCEDURE — 99215 OFFICE O/P EST HI 40 MIN: CPT | Mod: PBBFAC | Performed by: PHYSICIAN ASSISTANT

## 2024-12-04 NOTE — PROGRESS NOTES
[facility] Breast Surg  Breast Surgical Oncology  Follow-Up Patient Office Visit       Referring Provider: No ref. provider found  PCP: Arline Greenwood NP     Chief Complaint:   Chief Complaint   Patient presents with    Follow-up     Patient has no breast related concerns today        Subjective:   Treatment History:  2024: Left Mastectomy with Left SLNB  Letrozole 3/1/2024-present   Consultation with radiation oncology. XRT not recommended    Interval History:  2024 - Karo Lakhani presents for follow up for breast cancer. She is doing well since her surgery. She started Femara and is tolerating well. No concerns today.    HPI:  Karo Lakhani is a 83 y.o. female who presents on 2023 for evaluation of newly diagnosed left  breast cancer.     A detailed patient history was obtained and reviewed. She currently denies any breast issues including rashes, redness, pain, swelling, nipple discharge, or new lumps/masses.     MG breast density: Category B (scattered areas of fibroglandular tissue)      Imagin2023 BL DG MG/ L US BREAST LIMITED at [facility]- which revealed on L MG in the area of palpable concern at the 12 o'clock anterior to middle depth, there is a spiculated mass with associated heterogeneous calcifications which spans on the order of 5 cm mammographically and extends towards the nipple which appears mildly retracted. On R MG, there are no suspicious findings. On L US at 12 o'clock 4 cm FN,  there is an irregular hypoechoic mass with associated calcifications, spanning approximately 4.7 x 1.7 x 1.8 cm.  This demonstrates linear hypoechoic component extending toward the nipple as well as a presumed satellite nodule measuring 1.0 x 0.7 by 0.8 cm at the 11 o'clock  2 cm FN.  No pathologically enlarged lymph nodes visualized in the left axilla.  BIRADS-5 highly suspicious; biopsy recommended.    2024 R SCR MG at [facility] [place] - BIRADS 1: There is no  mammographic evidence of malignancy.         Pathology:  2023 Ultrasound-guided Core Needle Biopsy Left Breast 12 o'clock 4 cm FN-         - Invasive ductal carcinoma, grade 2 (measuring 1.5 cm)         - Focal ductal carcinoma in situ, grade 2 (measuring 1 mm)           ER 95%           MO 94%           HER 2 mj 2+/ FISH Negative           Ki67 27%    2. 2024 Left Mastectomy with Left [place] Lymph Node Biopsy which revealed invasive ductal carcinoma, grade 2 (measuring 3.8 cm) with focal microcalcifications.  Ductal carcinoma in situ, not extensive, grade 2 (measuring 4 cm)with solid to slightly cribriform architecture, focal comedonecrosis and focal microcalcifications. Multifocal lymphatic tumor invasion identified.  All margins clear.  One out of five lymph nodes positive for micrometastatic tumor.      OB/GYN History:  Age at Menarche Onset: 14  Menopausal Status: postmenopausal, LMP: No LMP recorded. Patient is postmenopausal.  Hysterectomy/Oophorectomy: menopause, at age 56  Hormonal birth control (duration): No none.   Pregnancy History:   Age at first live birth: 1  Hormone Replacement Therapy: No, none  Patient did not breast feed.  Patient denies nipple discharge.   Patient denies to previous breast biopsy.   Patient denies to a personal history of breast cancer.     Other Relevant History:  Prior thoracic RT: none  Genetic testing: No  Ashkenazi Evangelical descent: No     Family History:  Family History   Problem Relation Name Age of Onset    Dementia Mother  80 - 89    Heart disease Mother      Colon cancer Father Dudley Lakhani 91    Cancer Father Dudley Lakhani         Colon cancer    Throat cancer Maternal Grandmother      Skin cancer Maternal Grandmother      Cancer Sister Joy Greene         Liver cancer        Past History:  Past Medical History:   Diagnosis Date    Cancer 10/23        Past Surgical History:   Procedure Laterality Date    AXILLARY NODE DISSECTION Left 2024     Procedure: LYMPHADENECTOMY, AXILLARY - LEFT;  Surgeon: Dayami Escobar MD;  Location: [facility] OR;  Service: General;  Laterality: Left;    BREAST SURGERY  01/12/2024    COLONOSCOPY      FRACTURE SURGERY  10/2017    Hip Surgery    HIP FRACTURE SURGERY Left 10/2017    SENTINEL LYMPH NODE BIOPSY Left 01/12/2024    Procedure: BIOPSY, LYMPH NODE, SENTINEL - LEFT MagTrace Injected in office SentiMag needed No NUC MED;  Surgeon: Dayami Escobar MD;  Location: [facility] OR;  Service: General;  Laterality: Left;  MagTrace Injected in office  SentiMag needed  No NUC MED    SIMPLE MASTECTOMY Left 01/12/2024    Procedure: MASTECTOMY, SIMPLE - LEFT;  Surgeon: Dayami Escobar MD;  Location: [facility] OR;  Service: General;  Laterality: Left;        Social History     Socioeconomic History    Marital status: Single   Tobacco Use    Smoking status: Never     Passive exposure: Never    Smokeless tobacco: Never   Substance and Sexual Activity    Alcohol use: Never    Drug use: Never    Sexual activity: Never        Body mass index is 21.46 kg/m².     Allergy/Medications:   Review of patient's allergies indicates:  No Known Allergies       Current Outpatient Medications:     alendronate (FOSAMAX) 70 MG tablet, Take 1 tablet (70 mg total) by mouth every 7 days., Disp: 4 tablet, Rfl: 11    calcium carbonate-vitamin D3 600 mg-10 mcg (400 unit) Cap, Take by mouth once., Disp: , Rfl:     letrozole (FEMARA) 2.5 mg Tab, Take 1 tablet (2.5 mg total) by mouth once daily., Disp: 30 tablet, Rfl: 2  No current facility-administered medications for this visit.    Facility-Administered Medications Ordered in Other Visits:     HYDROcodone-acetaminophen 5-325 mg per tablet 1 tablet, 1 tablet, Oral, Q3H PRN, Carla Shearer MD    HYDROmorphone (PF) injection 0.4 mg, 0.4 mg, Intravenous, Q5 Min PRN, Carla Shearer MD, 0.2 mg at 01/12/24 1322    lactated ringers infusion, , Intravenous, Continuous, Carla Shearer MD, Last Rate: 10 mL/hr at  "01/12/24 1350, New Bag at 01/12/24 1350    LIDOcaine (PF) 10 mg/ml (1%) injection 10 mg, 1 mL, Intradermal, Once, Carla Shearer MD    midazolam (VERSED) 1 mg/mL injection 2 mg, 2 mg, Intravenous, Once PRN, Carla Shearer MD    ondansetron injection 4 mg, 4 mg, Intravenous, Daily PRN, Carla Shearer MD    prochlorperazine injection Soln 5 mg, 5 mg, Intravenous, Q30 Min PRN, Carla Shearer MD       Review of Systems:  Review of Systems   All other systems reviewed and are negative.    Objective:     Vitals:  Blood pressure (!) 169/72, pulse 63, temperature 97.7 °F (36.5 °C), temperature source Oral, resp. rate 18, height 5' 4" (1.626 m), weight 56.7 kg (125 lb), SpO2 99%.    Physical Exam:  General: The patient is awake, alert and oriented times three. The patient is well nourished and in no acute distress.  Neck: There is no evidence of palpable cervical, supraclavicular or axillary adenopathy. The neck is supple. The thyroid is not enlarged.  Musculoskeletal: The patient has a normal range of motion of her bilateral upper extremities.  Chest: Examination of the chest wall fails to reveal any obvious abnormalities. Nonlabored breathing, symmetric expansion.  Breast:  Right:  Examination of right breast fails to reveal any dominant masses or areas of significant focal nodularity. The nipple is everted without evidence of discharge. There is no skin dimpling with movement of the pectoralis. There are no significant skin changes overlying the breast.   Left: Examination of the Left mastectomy fails to reveal any dominant masses or areas of significant focal nodularity. The nipple is surgically absent. There are no significant skin changes overlying the breasts. There is no skin dimpling with movement of the pectoralis.   Abdomen: The abdomen is soft, flat, nontender and nondistended.  Integumentary: no rashes or skin lesions present  Neurologic: cranial nerves intact, no signs of peripheral " neurological deficit, motor/sensory function intact      Assessment and Plan:     Encounter Diagnoses   Name Primary?    Malignant neoplasm of overlapping sites of left breast in female, estrogen receptor positive Yes    Status post left mastectomy     Status post lymph node biopsy         12/4/2024 - Karo Lakhani presents for follow up for breast cancer. She is doing well since her surgery. She started Femara and is tolerating well. No concerns today.    Plan:     Continue AI and follow up with oncology.    R SCR MG in November 2025. To be ordered by oncologist.    She would like to follow up as needed to reduce number of doctors visits and transition all surveillance follow up to oncologist in [place]. I am in agreement with this plan and will have her RTC PRN.     Healthy lifestyle guidelines were reviewed. She was encouraged to engage in regular exercise, maintain a healthy body weight, and avoid excessive alcohol consumption. Healthy nutritional guidelines were also discussed. Self-breast examination was reviewed with the patient in detail and she was encouraged to perform this on a monthly basis.       All of her questions were answered. She was advised to call if she develops any questions or concerns.    Mitali Jimenez PA-C         --------------------------------------------------------------------------------------------------------------  Total time on the date of the visit ranged from 20-29 mins (50525). Total time includes both face-to-face and non-face-to-face time personally spent by myself on the day of the visit.    Non-face-to-face time included:  _X_ preparing to see the patient such as reviewing the patient record  __ obtaining and reviewing separately obtained history  _X_ independently interpreting results  _X_ documenting clinical information in electronic health record.    Face-to-face time included:  _X_ performing an appropriate history and examination  _X_ communicating results to  the patient  _X_ counseling and educating the patient  __ ordering appropriate medications  __ ordering appropriate tests  _X_ ordering appropriate procedures (including follow-up)  _X_ answering any questions the patient had    Total Time spent on date of visit: 25 minutes

## 2025-01-27 NOTE — PROGRESS NOTES
Subjective:       Patient ID: Karo Lakhani is a 84 y.o. female.    Chief Complaint: Follow up Malignant neoplasm of overlapping sites of left breast in f (No complaints. )       Diagnosis: Left Breast Cancer - Hormone Positive    Current Treatment:   Letrozole 3/1/2024-present  Unable to obtain dental clearance for prolia. Fosamax started 7/11/2024-present     Treatment History:   Left Breast Lumpectomy with SLNBx - Dr. Escobar - 1/12/24    HPI:  82yo F presented in February '24 for evaluation of hormone positive left breast cancer. She felt a mass in her left breast in Fall '23 and underwent diagnostic imagine in November '23 which revealed at the 12 o'clock position 4 CMFN, an irregular hypoechoic mass with associated calcifications, spanning approximately 4.7 x 1.7 x 1.8 cm. There was also noted to be a linear hypoechoic component extending toward the nipple as well as a presumed satellite nodule measuring 1.0 x 0.7 by 0.8 cm at the 11 o'clock  2 CMFN.  No pathologically enlarged lymph nodes visualized in the left axilla. Biopsy of the mass revealed Invasive ductal Carcinoma, G2, ER 95%, AZ 94%, Her2 2+, Fish Negative, Ki67 27%. She underwent left breast lumpectomy with Dr. Escobar on 1/12/24 with final pathology revealing a 3.8cm G2 IDC with approx 4cm of G2 DCIS as well. Of the 6 lymph nodes sampled, 1 returned positive for micrometastatic disease measuring 0.4mm. She had an oncotype done with a score of 15. Chemotherapy was not recommended given oncotype score. She was evaluated by radiation oncology who did not recommend adjuvant radiation. Plan is for adjuvant antihormonal therapy for at least 5 years.     Interval History:  She returns to the clinic today for a three month follow-up. She continues her letrozole without any side effects. She continues on fosamax weekly. Mammogram completed 11/30/2024 showed DAAM. She denies any breast complaints today. She denies any SOB, CP, headache, swelling, mood swings,  vaginal dryness, joint pain, or hot flashes. Nodule to right wrist is still present and occasionally painful. She has yet to follow-up with pcp for this. She also noted new mole her her back.        Past Medical History:   Diagnosis Date    Cancer 10/23      Past Surgical History:   Procedure Laterality Date    AXILLARY NODE DISSECTION Left 01/12/2024    Procedure: LYMPHADENECTOMY, AXILLARY - LEFT;  Surgeon: Dayami Escobar MD;  Location: [facility] OR;  Service: General;  Laterality: Left;    BREAST SURGERY  01/12/2024    COLONOSCOPY      FRACTURE SURGERY  10/2017    Hip Surgery    HIP FRACTURE SURGERY Left 10/2017    SENTINEL LYMPH NODE BIOPSY Left 01/12/2024    Procedure: BIOPSY, LYMPH NODE, SENTINEL - LEFT MagTrace Injected in office SentiMag needed No NUC MED;  Surgeon: Dayami Escobar MD;  Location: [facility] OR;  Service: General;  Laterality: Left;  MagTrace Injected in office  SentiMag needed  No NUC MED    SIMPLE MASTECTOMY Left 01/12/2024    Procedure: MASTECTOMY, SIMPLE - LEFT;  Surgeon: Dayami Escobar MD;  Location: [facility] OR;  Service: General;  Laterality: Left;     Social History     Socioeconomic History    Marital status: Single   Tobacco Use    Smoking status: Never     Passive exposure: Never    Smokeless tobacco: Never   Substance and Sexual Activity    Alcohol use: Never    Drug use: Never    Sexual activity: Never      Family History   Problem Relation Name Age of Onset    Dementia Mother  80 - 89    Heart disease Mother      Colon cancer Father Dudley Lakhani 91    Cancer Father Dudley Lakhani         Colon cancer    Throat cancer Maternal Grandmother      Skin cancer Maternal Grandmother      Cancer Sister Joy Greene         Liver cancer      Review of patient's allergies indicates:  No Known Allergies   Review of Systems   Constitutional:  Negative for activity change, appetite change, fever and unexpected weight change.   HENT:  Negative for mouth sores and sore throat.    Eyes:   Negative for visual disturbance.   Respiratory:  Negative for cough and shortness of breath.    Cardiovascular:  Negative for chest pain.   Gastrointestinal:  Negative for abdominal pain, diarrhea, nausea and vomiting.   Endocrine: Negative for polyuria.   Genitourinary:  Negative for dysuria, frequency and hot flashes.   Musculoskeletal:  Negative for back pain.        Right wrist nodule with pain    Integumentary:  Negative for rash.   Allergic/Immunologic: Negative for immunocompromised state.   Neurological:  Negative for weakness and headaches.   Hematological:  Negative for adenopathy.   Psychiatric/Behavioral:  Negative for confusion. The patient is not nervous/anxious.          Objective:      Vitals:    01/28/25 1051   BP: (!) 157/77   Pulse: (!) 57   Resp: 16   Temp: 97.9 °F (36.6 °C)             Physical Exam  Constitutional:       General: She is not in acute distress.     Appearance: Normal appearance. She is not ill-appearing.   HENT:      Head: Normocephalic and atraumatic.      Nose: Nose normal.      Mouth/Throat:      Mouth: Mucous membranes are moist.      Pharynx: Oropharynx is clear.   Eyes:      Extraocular Movements: Extraocular movements intact.      Conjunctiva/sclera: Conjunctivae normal.      Pupils: Pupils are equal, round, and reactive to light.   Cardiovascular:      Rate and Rhythm: Normal rate and regular rhythm.      Pulses: Normal pulses.      Heart sounds: Normal heart sounds. No murmur heard.  Pulmonary:      Effort: Pulmonary effort is normal. No respiratory distress.      Breath sounds: Normal breath sounds.   Chest:      Comments: Well healed left breast mastectomy with scar tissue noted to outer area of incision.  No adenopathy, masses, rashes, or drainage noted bilaterally.   Abdominal:      General: There is no distension.      Palpations: Abdomen is soft.      Tenderness: There is no abdominal tenderness.   Musculoskeletal:         General: Normal range of motion.       Cervical back: Normal range of motion and neck supple.      Right lower leg: No edema.      Left lower leg: No edema.   Lymphadenopathy:      Cervical: No cervical adenopathy.      Upper Body:      Right upper body: No supraclavicular or axillary adenopathy.      Left upper body: No supraclavicular or axillary adenopathy.   Skin:     General: Skin is warm and dry.   Neurological:      General: No focal deficit present.      Mental Status: She is alert and oriented to person, place, and time.         LABS AND IMAGING REVIEWED IN EPIC  11/16/2023 BL DG MG/ L US BREAST [facility] at [facility]- which revealed on L MG in the area of palpable concern at the 12 o'clock anterior to middle depth, there is a spiculated mass with associated heterogeneous calcifications which spans on the order of 5 cm mammographically and extends towards the nipple which appears mildly retracted. On R MG, there are no suspicious findings. On L US at 12 o'clock 4 cm FN,  there is an irregular hypoechoic mass with associated calcifications, spanning approximately 4.7 x 1.7 x 1.8 cm.  This demonstrates linear hypoechoic component extending toward the nipple as well as a presumed satellite nodule measuring 1.0 x 0.7 by 0.8 cm at the 11 o'clock  2 cm FN.  No pathologically enlarged lymph nodes visualized in the left axilla.  BIRADS-5 highly suspicious; biopsy recommended.  2/29/24 DEXA:  1. Osteoporosis of the lumbar spine  2. Osteopenia of the distal left radius  3. Osteoporosis of the right hip  11/30/2024 R SRC MMG:  There is no mammographic evidence of malignancy.     Pathology:    12/6/23 Left Breast Biopsy:  FINAL DIAGNOSIS    LEFT BREAST BIOPSY 12 O'CLOCK 4 CMFN:    INVASIVE DUCTAL CARCINOMA, NOS TYPE, AT LEAST 1.5 CM GREATEST DIMENSION.    FOCAL DUCTAL CARCINOMA IN SITU, COMEDO TYPE, NUCLEAR GRADE 2, 1 MM GREATEST   EXTENT.    ADAM HISTOLOGIC SCORE:   -- TUBULE FORMATION: SCORE 2   -- NUCLEAR PLEOMORPHISM: SCORE 2   -- MITOTIC ACTIVITY: SCORE  2   -- OVERALL SCORE: 6/9 (GRADE 2).   ER/NJ+, HER2-2+, Ki-67 27%    1/12/24 Left Lumpectomy:  FINAL DIAGNOSIS    1. LEFT BREAST (SIMPLE MASTECTOMY):    A) INVASIVE DUCTAL CARCINOMA (3.8 CM), GRADE 2 (2+2+2) WITH FOCAL   MICROCALCIFICATIONS AND FOCAL ORGANIZING PREVIOUS BIOPSY SITE.    B) DUCTAL CARCINOMA IN SITU, NOT EXTENSIVE (APPROXIMATELY 4 CM), GRADE 2, WITH   SOLID TO SLIGHTLY CRIBRIFORM ARCHITECTURE, FOCAL COMEDO NECROSIS AND FOCAL   MICROCALCIFICATIONS.    C) MULTIFOCAL LYMPHATIC TUMOR INVASION IDENTIFIED.    D) RESECTION MARGIN CLEAR (INVASIVE AND IN SITU TUMOR CLOSEST TO POSTERIOR   MARGIN AT 2.1 CM).    E) FATTY BREAST TISSUE WITH FOCAL NONPROLIFERATIVE FIBROCYSTIC CHANGES WITH   FOCAL MICROCALCIFICATIONS.    F) ONE LYMPH NODE NEGATIVE FOR METASTATIC TUMOR (0/1).    2. LYMPH NODE, LEFT AXILLARY SENTINEL #1 (EXCISION):    ONE LYMPH NODE POSITIVE FOR MICROMETASTATIC TUMOR (1/1).    3. LYMPH NODE, LEFT AXILLARY SENTINEL #2 (EXCISION):    ONE LYMPH NODE NEGATIVE FOR METASTATIC TUMOR (0/1).    4. LYMPH NODE, LEFT AXILLARY SENTINEL #3 (EXCISION):    TWO LYMPH NODES NEGATIVE FOR METASTATIC TUMOR (0/2).    5. LYMPH NODE, LEFT AXILLARY SENTINEL #4 (EXCISION):    ONE LYMPH NODE NEGATIVE FOR METASTATIC TUMOR (0/1).   [id number]; oncotype- 15    Assessment:   Stage 1B Left Breast Cancer - ER+/NJ+/Her2 2+ FISH negative - s/p mastectomy 1/24. T2N1mi. Oncotype 15. No role for chemotherapy. Deferred axillary XRT due to age and micromet disease. Plan for AI x 5 years.   Letrozole started 3/1/2024. Doing well, continue at this time.  Dexa 2/24   Osteoporosis noted, unable to get dental clearance to begin Prolia due to cost. Fosamax started.   repeat due 2/2026  Continue calcium and vitamin D supplement daily, weight bearing exercise was encouraged.   New and changing mole  Declines derm referral. States she will follow-up with pcp for this when she checks on her cyst        Plan:   Labs stable.  Continue with letrozole 2.5  mg daily, refill sent  Continue fosamax weekly  Continue calcium and vitamin D supplement  Now follow-ups with breast surgery PRN per pt request   Right screening mammogram due 11/21/2025, ordered today.   DEXA due 2/2026. Will order closer to time.   RTC in 3 months with NP for FU/lab  CBC CMP     I spent a total of 40 minutes on the day of the visit.This includes face to face time and non-face to face time preparing to see the patient (eg, review of tests), obtaining and/or reviewing separately obtained history, documenting clinical information in the electronic or other health record, independently interpreting results and communicating results to the patient/family/caregiver, or care coordinator.    BILLY Marrero-NICOLAS  Oncology/Hematology   Cancer Center [facility]     I personally reviewed all pertinent imaging, lab results, explained to the patient the pertinent information in detail including radiographic imaging, abnormal lab results. All questions were answered thoroughly. Total time spent on this visit was >40 minutes.

## 2025-01-28 ENCOUNTER — OFFICE VISIT (OUTPATIENT)
Dept: HEMATOLOGY/ONCOLOGY | Facility: CLINIC | Age: 85
End: 2025-01-28
Payer: MEDICARE

## 2025-01-28 ENCOUNTER — LAB VISIT (OUTPATIENT)
Dept: LAB | Facility: HOSPITAL | Age: 85
End: 2025-01-28
Payer: MEDICARE

## 2025-01-28 VITALS
RESPIRATION RATE: 16 BRPM | TEMPERATURE: 98 F | HEART RATE: 57 BPM | SYSTOLIC BLOOD PRESSURE: 157 MMHG | HEIGHT: 64 IN | WEIGHT: 118.81 LBS | DIASTOLIC BLOOD PRESSURE: 77 MMHG | OXYGEN SATURATION: 100 % | BODY MASS INDEX: 20.28 KG/M2

## 2025-01-28 DIAGNOSIS — M81.0 OSTEOPOROSIS, UNSPECIFIED OSTEOPOROSIS TYPE, UNSPECIFIED PATHOLOGICAL FRACTURE PRESENCE: ICD-10-CM

## 2025-01-28 DIAGNOSIS — Z12.31 BREAST CANCER SCREENING BY MAMMOGRAM: ICD-10-CM

## 2025-01-28 DIAGNOSIS — Z90.12 STATUS POST LEFT MASTECTOMY: ICD-10-CM

## 2025-01-28 DIAGNOSIS — C50.812 MALIGNANT NEOPLASM OF OVERLAPPING SITES OF LEFT BREAST IN FEMALE, ESTROGEN RECEPTOR POSITIVE: ICD-10-CM

## 2025-01-28 DIAGNOSIS — Z17.0 MALIGNANT NEOPLASM OF OVERLAPPING SITES OF LEFT BREAST IN FEMALE, ESTROGEN RECEPTOR POSITIVE: ICD-10-CM

## 2025-01-28 DIAGNOSIS — M81.0 AGE-RELATED OSTEOPOROSIS WITHOUT CURRENT PATHOLOGICAL FRACTURE: ICD-10-CM

## 2025-01-28 DIAGNOSIS — C50.812 MALIGNANT NEOPLASM OF OVERLAPPING SITES OF LEFT BREAST IN FEMALE, ESTROGEN RECEPTOR POSITIVE: Primary | ICD-10-CM

## 2025-01-28 DIAGNOSIS — Z17.0 MALIGNANT NEOPLASM OF OVERLAPPING SITES OF LEFT BREAST IN FEMALE, ESTROGEN RECEPTOR POSITIVE: Primary | ICD-10-CM

## 2025-01-28 DIAGNOSIS — Z79.811 LONG TERM (CURRENT) USE OF AROMATASE INHIBITORS: ICD-10-CM

## 2025-01-28 DIAGNOSIS — Z79.811 LONG TERM CURRENT USE OF AROMATASE INHIBITOR: ICD-10-CM

## 2025-01-28 DIAGNOSIS — Z98.890 STATUS POST LYMPH NODE BIOPSY: ICD-10-CM

## 2025-01-28 DIAGNOSIS — Z79.811 USE OF LETROZOLE (FEMARA): ICD-10-CM

## 2025-01-28 LAB
ALBUMIN SERPL-MCNC: 3.7 G/DL (ref 3.4–4.8)
ALBUMIN/GLOB SERPL: 1.4 RATIO (ref 1.1–2)
ALP SERPL-CCNC: 56 UNIT/L (ref 40–150)
ALT SERPL-CCNC: 13 UNIT/L (ref 0–55)
ANION GAP SERPL CALC-SCNC: 6 MEQ/L
AST SERPL-CCNC: 26 UNIT/L (ref 5–34)
BASOPHILS # BLD AUTO: 0.05 X10(3)/MCL
BASOPHILS NFR BLD AUTO: 0.7 %
BILIRUB SERPL-MCNC: 0.4 MG/DL
BUN SERPL-MCNC: 16 MG/DL (ref 9.8–20.1)
CALCIUM SERPL-MCNC: 9.9 MG/DL (ref 8.4–10.2)
CHLORIDE SERPL-SCNC: 107 MMOL/L (ref 98–107)
CO2 SERPL-SCNC: 28 MMOL/L (ref 23–31)
CREAT SERPL-MCNC: 0.84 MG/DL (ref 0.55–1.02)
CREAT/UREA NIT SERPL: 19
EOSINOPHIL # BLD AUTO: 0.14 X10(3)/MCL (ref 0–0.9)
EOSINOPHIL NFR BLD AUTO: 1.9 %
ERYTHROCYTE [DISTWIDTH] IN BLOOD BY AUTOMATED COUNT: 12.8 % (ref 11.5–17)
GFR SERPLBLD CREATININE-BSD FMLA CKD-EPI: >60 ML/MIN/1.73/M2
GLOBULIN SER-MCNC: 2.7 GM/DL (ref 2.4–3.5)
GLUCOSE SERPL-MCNC: 96 MG/DL (ref 82–115)
HCT VFR BLD AUTO: 41 % (ref 37–47)
HGB BLD-MCNC: 13.3 G/DL (ref 12–16)
IMM GRANULOCYTES # BLD AUTO: 0.01 X10(3)/MCL (ref 0–0.04)
IMM GRANULOCYTES NFR BLD AUTO: 0.1 %
LYMPHOCYTES # BLD AUTO: 1.87 X10(3)/MCL (ref 0.6–4.6)
LYMPHOCYTES NFR BLD AUTO: 25.8 %
MCH RBC QN AUTO: 29.8 PG (ref 27–31)
MCHC RBC AUTO-ENTMCNC: 32.4 G/DL (ref 33–36)
MCV RBC AUTO: 91.9 FL (ref 80–94)
MONOCYTES # BLD AUTO: 0.59 X10(3)/MCL (ref 0.1–1.3)
MONOCYTES NFR BLD AUTO: 8.1 %
NEUTROPHILS # BLD AUTO: 4.58 X10(3)/MCL (ref 2.1–9.2)
NEUTROPHILS NFR BLD AUTO: 63.4 %
NRBC BLD AUTO-RTO: 0 %
PLATELET # BLD AUTO: 229 X10(3)/MCL (ref 130–400)
PMV BLD AUTO: 9.8 FL (ref 7.4–10.4)
POTASSIUM SERPL-SCNC: 4 MMOL/L (ref 3.5–5.1)
PROT SERPL-MCNC: 6.4 GM/DL (ref 5.8–7.6)
RBC # BLD AUTO: 4.46 X10(6)/MCL (ref 4.2–5.4)
SODIUM SERPL-SCNC: 141 MMOL/L (ref 136–145)
WBC # BLD AUTO: 7.24 X10(3)/MCL (ref 4.5–11.5)

## 2025-01-28 PROCEDURE — 85025 COMPLETE CBC W/AUTO DIFF WBC: CPT

## 2025-01-28 PROCEDURE — 99214 OFFICE O/P EST MOD 30 MIN: CPT | Mod: PBBFAC

## 2025-01-28 PROCEDURE — 36415 COLL VENOUS BLD VENIPUNCTURE: CPT

## 2025-01-28 PROCEDURE — 99999 PR PBB SHADOW E&M-EST. PATIENT-LVL IV: CPT | Mod: PBBFAC,,,

## 2025-01-28 PROCEDURE — 80053 COMPREHEN METABOLIC PANEL: CPT

## 2025-01-28 PROCEDURE — 99215 OFFICE O/P EST HI 40 MIN: CPT | Mod: S$PBB,,,

## 2025-01-28 RX ORDER — LETROZOLE 2.5 MG/1
2.5 TABLET, FILM COATED ORAL DAILY
Qty: 30 TABLET | Refills: 3 | Status: SHIPPED | OUTPATIENT
Start: 2025-01-28

## 2025-01-28 RX ORDER — LETROZOLE 2.5 MG/1
2.5 TABLET, FILM COATED ORAL
COMMUNITY
Start: 2025-01-24 | End: 2025-01-28 | Stop reason: SDUPTHER

## 2025-02-03 ENCOUNTER — OFFICE VISIT (OUTPATIENT)
Dept: FAMILY MEDICINE | Facility: CLINIC | Age: 85
End: 2025-02-03
Payer: MEDICARE

## 2025-02-03 VITALS
BODY MASS INDEX: 19.81 KG/M2 | TEMPERATURE: 98 F | HEART RATE: 60 BPM | WEIGHT: 116 LBS | SYSTOLIC BLOOD PRESSURE: 137 MMHG | RESPIRATION RATE: 20 BRPM | DIASTOLIC BLOOD PRESSURE: 73 MMHG | HEIGHT: 64 IN

## 2025-02-03 DIAGNOSIS — M25.531 WRIST PAIN, ACUTE, RIGHT: ICD-10-CM

## 2025-02-03 DIAGNOSIS — L57.0 ACTINIC KERATOSIS: ICD-10-CM

## 2025-02-03 DIAGNOSIS — M67.431 GANGLION CYST OF WRIST, RIGHT: ICD-10-CM

## 2025-02-03 PROBLEM — K14.8 TONGUE LESION: Status: RESOLVED | Noted: 2023-03-03 | Resolved: 2025-02-03

## 2025-02-03 PROBLEM — J02.9 SORE THROAT: Status: RESOLVED | Noted: 2023-03-03 | Resolved: 2025-02-03

## 2025-02-03 PROCEDURE — 17110 DESTRUCTION B9 LES UP TO 14: CPT | Mod: ,,, | Performed by: NURSE PRACTITIONER

## 2025-02-03 PROCEDURE — 99214 OFFICE O/P EST MOD 30 MIN: CPT | Mod: ,,, | Performed by: NURSE PRACTITIONER

## 2025-02-03 RX ORDER — NAPROXEN 500 MG/1
500 TABLET ORAL 2 TIMES DAILY WITH MEALS
Qty: 40 TABLET | Refills: 0 | Status: SHIPPED | OUTPATIENT
Start: 2025-02-03 | End: 2025-02-23

## 2025-02-03 NOTE — PROGRESS NOTES
"Put Subjective:       Patient ID: Karo Lakhani is a 84 y.o. female.    Chief Complaint: Mole (Mole or wart on back) and Wrist Pain (Right wrist pain X's 4 months)      The patient thought she had a mole on her upper back but it is actinic keratosis.  We will perform cryotherapy.    The patient has right wrist pain and a small ganglion cyst.  I am going to order some anti-inflammatories and she will return to the office in 5 days for possible steroid injection if warranted.    Wrist Pain   The pain is present in the right wrist. This is a new problem. The current episode started 1 to 4 weeks ago. There has been no history of extremity trauma. The problem occurs intermittently. The problem has been gradually worsening. The quality of the pain is described as sharp. The pain is at a severity of 5/10. The pain is moderate. She has tried nothing for the symptoms.     Review of [id number] point review of systems conducted, negative except as stated in the history of present illness. See HPI for details.      Objective:      Visit Vitals  /73   Pulse 60   Temp 98 °F (36.7 °C)   Resp 20   Ht 5' 4.02" (1.626 m)   Wt 52.6 kg (116 lb)   BMI 19.90 kg/m²     Physical Exam  Vitals and nursing note reviewed.   HENT:      Head: Normocephalic.      Right Ear: Tympanic membrane normal.      Left Ear: Tympanic membrane normal.      Nose: Nose normal.      Mouth/Throat:      Mouth: Mucous membranes are moist.   Eyes:      Extraocular Movements: Extraocular movements intact.   Cardiovascular:      Rate and Rhythm: Normal rate and regular rhythm.      Heart sounds: No murmur heard.  Pulmonary:      Effort: Pulmonary effort is normal.      Breath sounds: Normal breath sounds.   Abdominal:      General: Bowel sounds are normal.      Palpations: Abdomen is soft.   Musculoskeletal:        Arms:       Cervical back: Normal range of motion and neck supple.      Comments: Small ganglion cyst   Skin:     General: Skin is warm and dry.    "   Findings: Lesion present.             Comments: Area of actinic keratosis   Neurological:      Mental Status: She is alert and oriented to person, place, and time.       Current Outpatient Medications   Medication Instructions    alendronate (FOSAMAX) 70 mg, Oral, Every 7 days    calcium carbonate-vitamin D3 600 mg-10 mcg (400 unit) Cap Once    letrozole (FEMARA) 2.5 mg, Oral, Daily    naproxen (NAPROSYN) 500 mg, Oral, 2 times daily with meals     has No Known Allergies.       Assessment:         ICD-10-CM ICD-9-CM   1. Actinic keratosis  L57.0 702.0   2. Wrist pain, acute, right  M25.531 719.43   3. Ganglion cyst of wrist, right  M67.431 727.41          Plan:       1. Actinic keratosis  Destruction benign lesion performed by cryotherapy  See quick procedure note  - CBC Auto Differential; Future  - Comprehensive Metabolic Panel; Future  - Urinalysis; Future  - Destruction, Benign Lesion    2. Wrist pain, acute, right  - naproxen (NAPROSYN) 500 MG tablet; Take 1 tablet (500 mg total) by mouth 2 (two) times daily with meals. for 20 days  Dispense: 40 tablet; Refill: 0  - CBC Auto Differential; Future  - Comprehensive Metabolic Panel; Future  - Urinalysis; Future    3. Ganglion cyst of wrist, right  - CBC Auto Differential; Future  - Comprehensive Metabolic Panel; Future  - Urinalysis; Future        Follow up in about 3 months (around 5/3/2025) for Wellness.     Future Appointments   Date Time Provider Department Center   5/16/2025  8:30 AM LAB, [facility] LAB [facility]   5/16/2025  9:30 AM Jeane Benavides P [facility] [place]

## 2025-02-03 NOTE — PROCEDURES
Destruction, Benign Lesion    Date/Time: 2/3/2025 10:00 AM    Performed by: Arline Greenwood NP  Authorized by: Arline Greenwood NP    Consent Done?:  Yes (Verbal)  Pre-Procedure:     Timeout: prior to procedure the correct patient, procedure, and site was verified      Local anesthesia used?: No    Indications:     other (Actinic keratosis)  Location:     Trunk:  Back  Prep:     Position:  Prone  Procedure Details:     Cosmetic?: No      Number of lesions:  1    Destruction method:  Cryotherapy    Bleeding:  None     Patient tolerated the procedure well with no immediate complications.     Post-operative instructions were provided for the patient.

## 2025-03-19 ENCOUNTER — OFFICE VISIT (OUTPATIENT)
Dept: FAMILY MEDICINE | Facility: CLINIC | Age: 85
End: 2025-03-19
Payer: MEDICARE

## 2025-03-19 VITALS
TEMPERATURE: 98 F | SYSTOLIC BLOOD PRESSURE: 112 MMHG | DIASTOLIC BLOOD PRESSURE: 71 MMHG | WEIGHT: 116 LBS | RESPIRATION RATE: 20 BRPM | HEART RATE: 70 BPM | HEIGHT: 64 IN | BODY MASS INDEX: 19.81 KG/M2

## 2025-03-19 DIAGNOSIS — M79.641 RIGHT HAND PAIN: ICD-10-CM

## 2025-03-19 DIAGNOSIS — M67.431 GANGLION CYST OF WRIST, RIGHT: Primary | ICD-10-CM

## 2025-03-19 DIAGNOSIS — M25.531 WRIST PAIN, ACUTE, RIGHT: ICD-10-CM

## 2025-03-19 PROCEDURE — 99214 OFFICE O/P EST MOD 30 MIN: CPT | Mod: ,,, | Performed by: NURSE PRACTITIONER

## 2025-03-19 PROCEDURE — 20612 ASPIRATE/INJ GANGLION CYST: CPT | Mod: ,,, | Performed by: NURSE PRACTITIONER

## 2025-03-19 RX ORDER — METHYLPREDNISOLONE ACETATE 40 MG/ML
40 INJECTION, SUSPENSION INTRA-ARTICULAR; INTRALESIONAL; INTRAMUSCULAR; SOFT TISSUE
Status: DISCONTINUED | OUTPATIENT
Start: 2025-03-19 | End: 2025-03-19 | Stop reason: HOSPADM

## 2025-03-19 RX ORDER — LIDOCAINE HYDROCHLORIDE 10 MG/ML
1 INJECTION, SOLUTION INFILTRATION; PERINEURAL
Status: DISCONTINUED | OUTPATIENT
Start: 2025-03-19 | End: 2025-03-19 | Stop reason: HOSPADM

## 2025-03-19 RX ADMIN — METHYLPREDNISOLONE ACETATE 40 MG: 40 INJECTION, SUSPENSION INTRA-ARTICULAR; INTRALESIONAL; INTRAMUSCULAR; SOFT TISSUE at 01:03

## 2025-03-19 RX ADMIN — LIDOCAINE HYDROCHLORIDE 1 ML: 10 INJECTION, SOLUTION INFILTRATION; PERINEURAL at 01:03

## 2025-03-19 NOTE — PROGRESS NOTES
"Subjective:       Patient ID: Karo Lakhani is a 84 y.o. female.    Chief Complaint: Hand Pain (Right hand pain, pt has a "cyst" on wrist)      The patient is an 84-year-old female presents with a small ganglion cyst on the right wrist area.  She states it is causing her to have right arm pain and right hand pain.  She states she is having difficulty performing activities of daily living such as washing her hair and doing her dishes due to pain in her right hand.  Relief measures at home have been none      Review of [id number] point review of systems conducted, negative except as stated in the history of present illness. See HPI for details.      Objective:      Visit Vitals  /71   Pulse 70   Temp 98.2 °F (36.8 °C)   Resp 20   Ht 5' 4.02" (1.626 m)   Wt 52.6 kg (116 lb)   BMI 19.90 kg/m²     Physical Exam  Vitals and nursing note reviewed.   HENT:      Head: Normocephalic.      Right Ear: Tympanic membrane normal.      Left Ear: Tympanic membrane normal.      Nose: Nose normal.      Mouth/Throat:      Mouth: Mucous membranes are moist.   Eyes:      Extraocular Movements: Extraocular movements intact.   Cardiovascular:      Rate and Rhythm: Normal rate and regular rhythm.      Heart sounds: No murmur heard.  Pulmonary:      Effort: Pulmonary effort is normal.      Breath sounds: Normal breath sounds.   Abdominal:      General: Bowel sounds are normal.      Palpations: Abdomen is soft.   Musculoskeletal:      Right wrist: Tenderness present. Decreased range of motion.      Cervical back: Normal range of motion and neck supple.      Comments: Ganglion cyst right inner aspect of wrist   Skin:     General: Skin is warm and dry.   Neurological:      Mental Status: She is alert and oriented to person, place, and time.       Current Outpatient Medications   Medication Instructions    alendronate (FOSAMAX) 70 mg, Oral, Every 7 days    calcium carbonate-vitamin D3 600 mg-10 mcg (400 unit) Cap Once    letrozole " (FEMARA) 2.5 mg, Oral, Daily     has no known allergies.       Assessment:         ICD-10-CM ICD-9-CM   1. Ganglion cyst of wrist, right  M67.431 727.41   2. Wrist pain, acute, right  M25.531 719.43   3. Right hand pain  M79.641 729.5          Plan:       1. Ganglion cyst of wrist, right (Primary)  - Ganglion Cyst: R intercarpal  - LIDOcaine HCL 10 mg/ml (1%) injection 1 mL  - methylPREDNISolone acetate injection 40 mg    2. Wrist pain, acute, right  - Ganglion Cyst: R intercarpal  - LIDOcaine HCL 10 mg/ml (1%) injection 1 mL  - methylPREDNISolone acetate injection 40 mg    3. Right hand pain  - Ganglion Cyst: R intercarpal  - LIDOcaine HCL 10 mg/ml (1%) injection 1 mL  - methylPREDNISolone acetate injection 40 mg        Follow up if symptoms worsen or fail to improve.     Future Appointments   Date Time Provider Department [place]   5/16/2025  8:30 AM LAB, [facility]   5/16/2025  9:30 AM Jeane Benavides FNP [facility] [place]

## 2025-03-19 NOTE — PROCEDURES
Ganglion Cyst: R intercarpal    Date/Time: 3/19/2025 1:30 PM    Performed by: Arline Greenwood NP  Authorized by: Arline Greenwood NP    Consent Done?:  Yes (Verbal)  Indications:  Pain  Site marked: the procedure site was marked    Timeout: prior to procedure the correct patient, procedure, and site was verified    Local anesthesia used?: No    Location:  Wrist  Site:  R intercarpal  Ultrasonic Guidance for Needle Placement?: No    Needle size:  22 G  Medications:  1 mL LIDOcaine HCL 10 mg/ml (1%) 10 mg/mL (1 %); 40 mg methylPREDNISolone acetate 40 mg/mL  Aspirate amount (ml):  0  Patient tolerance:  Patient tolerated the procedure well with no immediate complications

## 2025-04-02 ENCOUNTER — OFFICE VISIT (OUTPATIENT)
Dept: FAMILY MEDICINE | Facility: CLINIC | Age: 85
End: 2025-04-02
Payer: MEDICARE

## 2025-04-02 VITALS
TEMPERATURE: 98 F | HEART RATE: 62 BPM | RESPIRATION RATE: 20 BRPM | WEIGHT: 114 LBS | HEIGHT: 64 IN | DIASTOLIC BLOOD PRESSURE: 77 MMHG | SYSTOLIC BLOOD PRESSURE: 128 MMHG | BODY MASS INDEX: 19.46 KG/M2

## 2025-04-02 DIAGNOSIS — M67.431 GANGLION CYST OF DORSUM OF RIGHT WRIST: ICD-10-CM

## 2025-04-02 DIAGNOSIS — M25.531 RIGHT WRIST PAIN: ICD-10-CM

## 2025-04-02 DIAGNOSIS — M79.641 RIGHT HAND PAIN: ICD-10-CM

## 2025-04-02 PROCEDURE — 99214 OFFICE O/P EST MOD 30 MIN: CPT | Mod: ,,, | Performed by: NURSE PRACTITIONER

## 2025-04-02 NOTE — PROGRESS NOTES
Subjective:       Patient ID: Karo Lakhani is a 84 y.o. female.    Chief Complaint: Hand Pain      The patient is an 84-year-old female who presents with a very small cyst dorsal aspect of her wrist.  She is complaining of wrist pain that is severe at times and she is complaining of hand pain.  She states the biggest difficulty is that she has a hard time holding onto a disclose or sweeping with a broom due to pain in her wrist and hand.  We treated her on a previous visit on March 19, 2025 with an injection of Depo-Medrol and some anti inflammatory medication.  The patient states it did not get rid of the discomfort.  I will order some studies and possibly refer her for surgical intervention.    Hand Pain   Her dominant hand is their right hand. The incident occurred more than 1 week ago. The incident occurred at home. There was no injury mechanism. The pain is present in the right hand. The quality of the pain is described as shooting. The pain is at a severity of 7/10. The pain is moderate. The pain has been Fluctuating since the incident. Associated symptoms include muscle weakness. The symptoms are aggravated by movement and lifting (grabbing a broom or dish cloth). She has tried NSAIDs for the symptoms. The treatment provided mild relief.   Wrist Pain   The pain is present in the right wrist. This is a chronic problem. The current episode started more than 1 month ago. There has been no history of extremity trauma. The problem occurs intermittently. The problem has been gradually worsening. The quality of the pain is described as sharp. The pain is at a severity of 9/10. The pain is severe. Associated symptoms include a limited range of motion. The symptoms are aggravated by activity. She has tried NSAIDS for the symptoms. The treatment provided mild relief.     Review of [id number] point review of systems conducted, negative except as stated in the history of present illness. See HPI for details.     "  Objective:      Visit Vitals  /77   Pulse 62   Temp 97.7 °F (36.5 °C)   Resp 20   Ht 5' 4.02" (1.626 m)   Wt 51.7 kg (114 lb)   BMI 19.56 kg/m²     Physical Exam  Vitals and nursing note reviewed.   HENT:      Head: Normocephalic.      Nose: Nose normal.   Eyes:      Extraocular Movements: Extraocular movements intact.   Cardiovascular:      Rate and Rhythm: Normal rate and regular rhythm.      Pulses: Normal pulses.      Heart sounds: No murmur heard.  Pulmonary:      Effort: Pulmonary effort is normal.      Breath sounds: Normal breath sounds.   Abdominal:      Palpations: Abdomen is soft.   Musculoskeletal:        Arms:       Cervical back: Normal range of motion and neck supple.      Comments: Limited range of motion right wrist and right hand due to pain.  Small cyst   Skin:     General: Skin is warm and dry.   Neurological:      Mental Status: She is alert and oriented to person, place, and time.       Current Outpatient Medications   Medication Instructions    alendronate (FOSAMAX) 70 mg, Oral, Every 7 days    calcium carbonate-vitamin D3 600 mg-10 mcg (400 unit) Cap Once    letrozole (FEMARA) 2.5 mg, Oral, Daily     has no known allergies.       Assessment:         ICD-10-CM ICD-9-CM   1. Ganglion cyst of dorsum of right wrist  M67.431 727.41   2. Right wrist pain  M25.531 719.43   3. Right hand pain  M79.641 729.5          Plan:       1. Ganglion cyst of dorsum of right wrist  Imaging studies required to ascertain the extent of involvement of tissue    2. Right wrist pain  - X-Ray Wrist Complete Right; Future-call patient results  If negative we will order MRI    3. Right hand pain  - X-Ray Hand Complete Right; Future-call patient results  If negative we will order MRI        Follow up if symptoms worsen or fail to improve.     Future Appointments   Date Time Provider Department Center   5/16/2025  8:30 AM LAB, [facility] [place]   5/16/2025  9:30 AM [facility] [place]      "

## 2025-04-03 ENCOUNTER — HOSPITAL ENCOUNTER (OUTPATIENT)
Dept: RADIOLOGY | Facility: HOSPITAL | Age: 85
Discharge: HOME | End: 2025-04-03
Attending: NURSE PRACTITIONER
Payer: MEDICARE

## 2025-04-03 DIAGNOSIS — M25.531 RIGHT WRIST PAIN: ICD-10-CM

## 2025-04-03 DIAGNOSIS — M79.641 RIGHT HAND PAIN: ICD-10-CM

## 2025-04-03 PROCEDURE — 73130 X-RAY EXAM OF HAND: CPT | Mod: TC,RT

## 2025-04-03 PROCEDURE — 73110 X-RAY EXAM OF WRIST: CPT | Mod: TC,RT

## 2025-04-04 ENCOUNTER — TELEPHONE (OUTPATIENT)
Dept: FAMILY MEDICINE | Facility: CLINIC | Age: 85
End: 2025-04-04
Payer: MEDICARE

## 2025-04-04 DIAGNOSIS — M67.431 GANGLION CYST OF DORSUM OF RIGHT WRIST: ICD-10-CM

## 2025-04-04 DIAGNOSIS — M25.531 PAIN IN JOINT OF RIGHT WRIST: Primary | ICD-10-CM

## 2025-04-04 DIAGNOSIS — M79.641 PAIN IN RIGHT HAND: ICD-10-CM

## 2025-04-04 NOTE — TELEPHONE ENCOUNTER
I called the patient and left her a voicemail about her x-ray of the right hand which was negative.  I ordered an MRI because she has a small ganglion cyst on that right wrist and may need surgical intervention.

## 2025-04-09 ENCOUNTER — HOSPITAL ENCOUNTER (OUTPATIENT)
Dept: RADIOLOGY | Facility: HOSPITAL | Age: 85
Discharge: HOME | End: 2025-04-09
Attending: NURSE PRACTITIONER
Payer: MEDICARE

## 2025-04-09 DIAGNOSIS — M67.431 GANGLION CYST OF DORSUM OF RIGHT WRIST: ICD-10-CM

## 2025-04-09 DIAGNOSIS — M79.641 PAIN IN RIGHT HAND: ICD-10-CM

## 2025-04-09 DIAGNOSIS — M25.531 PAIN IN JOINT OF RIGHT WRIST: ICD-10-CM

## 2025-04-09 PROCEDURE — 73221 MRI JOINT UPR EXTREM W/O DYE: CPT | Mod: TC,RT

## 2025-04-16 DIAGNOSIS — S63.004S: Primary | ICD-10-CM

## 2025-04-16 DIAGNOSIS — M25.531 WRIST PAIN, RIGHT: Primary | ICD-10-CM

## 2025-04-23 ENCOUNTER — OFFICE VISIT (OUTPATIENT)
Dept: ORTHOPEDICS | Facility: CLINIC | Age: 85
End: 2025-04-23
Payer: MEDICARE

## 2025-04-23 VITALS
DIASTOLIC BLOOD PRESSURE: 79 MMHG | SYSTOLIC BLOOD PRESSURE: 123 MMHG | WEIGHT: 115.81 LBS | HEART RATE: 65 BPM | BODY MASS INDEX: 19.77 KG/M2 | HEIGHT: 64 IN

## 2025-04-23 DIAGNOSIS — M18.11 ARTHRITIS OF CARPOMETACARPAL (CMC) JOINT OF RIGHT THUMB: Primary | ICD-10-CM

## 2025-04-23 DIAGNOSIS — S63.004S: ICD-10-CM

## 2025-04-23 PROCEDURE — 99203 OFFICE O/P NEW LOW 30 MIN: CPT | Mod: ,,, | Performed by: REHABILITATION UNIT

## 2025-04-23 NOTE — PROGRESS NOTES
Subjective:      Patient ID: Karo Lakhani is a 84 y.o. female.    Chief Complaint: Injury of the Right Wrist (X-ray done 4/3/25, & MRI done 4/9/25- Ongoing pain for months around January. Has not gotten the results read to her.  Pain radiates from wrist into hand. Has a shooting pain at times. Swelling occurs in thumb at times. Noticed a loss of strengthen in the hand at times. Had an injection/aspiration of a cyst on 3/19/25 but has not helped too much of the pain.  IS taking ibuprofen prn which helps some the pain. Denies any numbness. )    HPI:   Karo Lakhani is a 84 y.o. female who presents today for initial evaluation of her RUE    History of Present Illness    CHIEF COMPLAINT:  - Right wrist pain and bump    HPI:  Karo presents with right wrist pain that began in January. She first noticed a small bump in the wrist area around October or November of last year. Pain is described as shooting and has progressed to involve the hand during activities like washing dishes or sleeping. Pain severity peaked in January, February, and March, rating it 7/10 on a scale of 1 to 10. With warmer weather, pain has improved, now rating it 2-3/10. Pain is most noticeable during activities and sometimes localized to specific areas of the wrist.    She reports difficulty with tasks such as turning doorknobs, opening cans and jars, washing dishes, and using a broom. She is right-handed.    A cyst in her wrist was drained, though the exact date is not specified. She occasionally takes ibuprofen for pain relief.    She denies any history of wrist fractures or dislocations.    PREVIOUS TREATMENTS:  - Cyst drainage: Right wrist, provided some relief    MEDICATIONS:  - Ibuprofen: Occasionally for wrist pain    ROS:  Musculoskeletal: +limb pain, +shooting pain sensation, +pain with movement, +lumps/masses          Past Medical History:   Diagnosis Date    Cancer 10/23     Past Surgical History:   Procedure Laterality Date     "AXILLARY NODE DISSECTION Left 01/12/2024    Procedure: LYMPHADENECTOMY, AXILLARY - LEFT;  Surgeon: Dayami Escobar MD;  Location: [facility] OR;  Service: General;  Laterality: Left;    BREAST SURGERY  01/12/2024    COLONOSCOPY      FRACTURE SURGERY  10/2017    Hip Surgery    HIP FRACTURE SURGERY Left 10/2017    JOINT REPLACEMENT      SENTINEL LYMPH NODE BIOPSY Left 01/12/2024    Procedure: BIOPSY, LYMPH NODE, SENTINEL - LEFT MagTrace Injected in office SentiMag needed No NUC MED;  Surgeon: Dayami Escobar MD;  Location: [facility] OR;  Service: General;  Laterality: Left;  MagTrace Injected in office  SentiMag needed  No NUC MED    SIMPLE MASTECTOMY Left 01/12/2024    Procedure: MASTECTOMY, SIMPLE - LEFT;  Surgeon: Dayami Escobar MD;  Location: [facility] OR;  Service: General;  Laterality: Left;     Social History[1]    Current Medications[2]  Review of patient's allergies indicates:  No Known Allergies    /79   Pulse 65   Ht 5' 4" (1.626 m)   Wt 52.5 kg (115 lb 12.8 oz)   BMI 19.88 kg/m²     Comprehensive review of systems completed and negative except as per HPI.        Objective:   Head: Normocephalic, without obvious abnormality, atraumatic  Eyes: conjunctivae/corneas clear. EOM's intact  Ears: normal external appearance  Nose: Nares normal. Septum midline. Mucosa normal. No drainage  Throat: normal findings: lips normal without lesions  Lungs: unlabored breathing on room air  Chest wall: symmetric chest rise  Heart: regular rate and rhythm  Pulses: 2+ and symmetric  Skin: Skin color, texture, turgor normal. No rashes or lesions  Neurologic: Grossly normal    right upper extremity    Appearance:   Skin intact; no significant swelling  no thenar atrophy     Tenderness:   Mild thumb cmc   Minimally positive grind test     ROM:   Full of the wrist, hand, and fingers     Pulses: Palpable radial pulse    Neurological deficits: None    - Tinels at the elbow  - Tinels at the wrist   - Phalen's Durkan's    The " patient has a warm and well-perfused upper extremity with capillary refill less than 2 seconds  Sensation is intact to light touch in terminal nerve distributions  5/5 ain/pin/uln  The patient has no palpable epitrochlear lymphadenopathy    Assessment:     Imaging:   MRI Wrist Joint Without Contrast Right  Narrative: EXAMINATION:  MRI WRIST WITHOUT CONTRAST RIGHT    CLINICAL HISTORY:  Soft tissue mass, wrist, deep;  Pain in right wrist    TECHNIQUE:  Sequences and image planes obtained include axial, coronal and sagittal T1 and T2 weighted images with fat saturation and coronal 3D T2 weighted gradient echo images.    COMPARISON:  Radiographs dated 04/03/2025.    FINDINGS:  A skin marker was not placed    There is no significant bone marrow edema.  Degenerative changes are evident.  Narrowing of the 1st carpometacarpal joint with small articular surface osteophytes and prominent subchondral cystic changes of the articular surface is noted.  There appears to be subluxation of the joint.  Lobulated cysts are evident in the base of the 1st metacarpal, could represent intraosseous cysts as they extend beyond the articular surface.    A focal soft tissue abnormality is not evident at the dorsal aspect of the wrist.  There is however dorsal subluxation of the distal ulna, which is better demonstrated on the sagittal views.  There is only minimal T2 signal hyperintensity evident within the dorsal aspect of the ulnar head.  There is mild widening of the distal radioulnar space at its ventral aspect.  Evaluation of ligaments is limited.  No fluid signal is evident.    No significant joint effusions identified.  The extensor and flexor tendons have normal morphology and signal intensity.  There is normal appearance of the flexor retinaculum.  There is normal appearance of the median nerve.  No discrete abnormality of the triangular fibrocartilage complex is noted.  Soft tissues are unremarkable, with no cystic lesions  identified.  Impression: 1. There is dorsal translation of the distal ulna with respect to the radius, evident in the sagittal sequences, indicating dorsal distal radioulnar joint subluxation/dislocation.  There is widening of the ventral aspect of the radioulnar joint in the axial views.  Clinical correlation advised.  2. Subtle edematous signal within the distal ulna, could be reactive or associated with minimal associated contusion.  3. Additional findings as above.  No focal soft tissue lesions identified.    Electronically signed by: Kari Day MD  Date:    04/09/2025  Time:    16:39    - XR Right Hand and Wrist: Mild arthritis at the base of the thumb and throughout the wrist, no fractures or misalignments  - MRI Right Wrist: Slight subluxation of the distal ulna, small area of increased edema or inflammation, degenerative changes in the thumb        1. Arthritis of carpometacarpal (CMC) joint of right thumb    2. Closed dislocation of right wrist, sequela          Plan:             Imaging and exam findings discussed.  No ulnar pain or DRUJ instability noted or symptomatic.  She does have some CMC OA that is doing okay today.    Assessment & Plan    MEDICATIONS:  - Continue ibuprofen as needed for pain.    PROCEDURES:  - Discussed potential corticosteroid injection for thumb pain if it flares up again at CMC     FOLLOW UP:  - Contact the office if wrist pain flares up and persists, to consider PT for motion and strength.  - Contact the office if thumb pain significantly increases, to consider steroid injection.    PATIENT INSTRUCTIONS:  - Use wrist brace for support when symptoms flare up.       Rest, ice, compression, and elevation encouraged.  Avoid aggravating activities and symptomatic management.    All questions were answered. Patient happy and in agreement with the plan.     This note was generated with the assistance of ambient listening technology. Verbal consent was obtained by the  patient and accompanying visitor(s) for the recording of patient appointment to facilitate this note. I attest to having reviewed and edited the generated note for accuracy, though some syntax or spelling errors may persist. Please contact the author of this note for any clarification.         [1]   Social History  Socioeconomic History    Marital status: Single   Tobacco Use    Smoking status: Never     Passive exposure: Never    Smokeless tobacco: Never   Substance and Sexual Activity    Alcohol use: Never    Drug use: Never    Sexual activity: Never   [2]   Current Outpatient Medications:     alendronate (FOSAMAX) 70 MG tablet, Take 1 tablet (70 mg total) by mouth every 7 days., Disp: 4 tablet, Rfl: 11    calcium carbonate-vitamin D3 600 mg-10 mcg (400 unit) Cap, Take by mouth once., Disp: , Rfl:     letrozole (FEMARA) 2.5 mg Tab, Take 1 tablet (2.5 mg total) by mouth once daily., Disp: 30 tablet, Rfl: 3  No current facility-administered medications for this visit.    Facility-Administered Medications Ordered in Other Visits:     HYDROcodone-acetaminophen 5-325 mg per tablet 1 tablet, 1 tablet, Oral, Q3H PRN, Carla Shearer MD    HYDROmorphone (PF) injection 0.4 mg, 0.4 mg, Intravenous, Q5 Min PRN, Carla Shearer MD, 0.2 mg at 01/12/24 1322    lactated ringers infusion, , Intravenous, Continuous, Carla Shearer MD, Last Rate: 10 mL/hr at 01/12/24 1350, New Bag at 01/12/24 1350    LIDOcaine (PF) 10 mg/ml (1%) injection 10 mg, 1 mL, Intradermal, Once, Carla Shearer MD    midazolam (VERSED) 1 mg/mL injection 2 mg, 2 mg, Intravenous, Once PRN, Carla Shearer MD    ondansetron injection 4 mg, 4 mg, Intravenous, Daily PRN, Carla Shearer MD    prochlorperazine injection Soln 5 mg, 5 mg, Intravenous, Q30 Min PRN, Carla Shearer MD

## 2025-05-09 DIAGNOSIS — M81.0 OSTEOPOROSIS, UNSPECIFIED OSTEOPOROSIS TYPE, UNSPECIFIED PATHOLOGICAL FRACTURE PRESENCE: ICD-10-CM

## 2025-05-12 RX ORDER — ALENDRONATE SODIUM 70 MG/1
TABLET ORAL
Qty: 4 TABLET | Refills: 11 | Status: SHIPPED | OUTPATIENT
Start: 2025-05-12

## 2025-05-23 ENCOUNTER — LAB VISIT (OUTPATIENT)
Dept: LAB | Facility: HOSPITAL | Age: 85
End: 2025-05-23
Payer: MEDICARE

## 2025-05-23 ENCOUNTER — OFFICE VISIT (OUTPATIENT)
Facility: CLINIC | Age: 85
End: 2025-05-23
Payer: MEDICARE

## 2025-05-23 VITALS
RESPIRATION RATE: 16 BRPM | SYSTOLIC BLOOD PRESSURE: 127 MMHG | OXYGEN SATURATION: 96 % | HEART RATE: 66 BPM | BODY MASS INDEX: 19.86 KG/M2 | DIASTOLIC BLOOD PRESSURE: 75 MMHG | TEMPERATURE: 98 F | HEIGHT: 64 IN | WEIGHT: 116.31 LBS

## 2025-05-23 DIAGNOSIS — M81.0 OSTEOPOROSIS, UNSPECIFIED OSTEOPOROSIS TYPE, UNSPECIFIED PATHOLOGICAL FRACTURE PRESENCE: ICD-10-CM

## 2025-05-23 DIAGNOSIS — Z17.0 MALIGNANT NEOPLASM OF OVERLAPPING SITES OF LEFT BREAST IN FEMALE, ESTROGEN RECEPTOR POSITIVE: Primary | ICD-10-CM

## 2025-05-23 DIAGNOSIS — C50.812 MALIGNANT NEOPLASM OF OVERLAPPING SITES OF LEFT BREAST IN FEMALE, ESTROGEN RECEPTOR POSITIVE: ICD-10-CM

## 2025-05-23 DIAGNOSIS — Z17.0 MALIGNANT NEOPLASM OF OVERLAPPING SITES OF LEFT BREAST IN FEMALE, ESTROGEN RECEPTOR POSITIVE: ICD-10-CM

## 2025-05-23 DIAGNOSIS — C50.812 MALIGNANT NEOPLASM OF OVERLAPPING SITES OF LEFT BREAST IN FEMALE, ESTROGEN RECEPTOR POSITIVE: Primary | ICD-10-CM

## 2025-05-23 DIAGNOSIS — M81.0 OSTEOPOROSIS WITHOUT CURRENT PATHOLOGICAL FRACTURE, UNSPECIFIED OSTEOPOROSIS TYPE: ICD-10-CM

## 2025-05-23 LAB
ALBUMIN SERPL-MCNC: 3.7 G/DL (ref 3.4–4.8)
ALBUMIN/GLOB SERPL: 1.2 RATIO (ref 1.1–2)
ALP SERPL-CCNC: 68 UNIT/L (ref 40–150)
ALT SERPL-CCNC: 12 UNIT/L (ref 0–55)
ANION GAP SERPL CALC-SCNC: 8 MEQ/L
AST SERPL-CCNC: 23 UNIT/L (ref 11–45)
BASOPHILS # BLD AUTO: 0.06 X10(3)/MCL
BASOPHILS NFR BLD AUTO: 0.9 %
BILIRUB SERPL-MCNC: 0.4 MG/DL
BUN SERPL-MCNC: 15 MG/DL (ref 9.8–20.1)
CALCIUM SERPL-MCNC: 9.8 MG/DL (ref 8.4–10.2)
CHLORIDE SERPL-SCNC: 107 MMOL/L (ref 98–107)
CO2 SERPL-SCNC: 28 MMOL/L (ref 23–31)
CREAT SERPL-MCNC: 0.91 MG/DL (ref 0.55–1.02)
CREAT/UREA NIT SERPL: 16
EOSINOPHIL # BLD AUTO: 0.1 X10(3)/MCL (ref 0–0.9)
EOSINOPHIL NFR BLD AUTO: 1.5 %
ERYTHROCYTE [DISTWIDTH] IN BLOOD BY AUTOMATED COUNT: 13.2 % (ref 11.5–17)
GFR SERPLBLD CREATININE-BSD FMLA CKD-EPI: >60 ML/MIN/1.73/M2
GLOBULIN SER-MCNC: 3.1 GM/DL (ref 2.4–3.5)
GLUCOSE SERPL-MCNC: 93 MG/DL (ref 82–115)
HCT VFR BLD AUTO: 41.4 % (ref 37–47)
HGB BLD-MCNC: 13.9 G/DL (ref 12–16)
IMM GRANULOCYTES # BLD AUTO: 0.01 X10(3)/MCL (ref 0–0.04)
IMM GRANULOCYTES NFR BLD AUTO: 0.2 %
LYMPHOCYTES # BLD AUTO: 1.79 X10(3)/MCL (ref 0.6–4.6)
LYMPHOCYTES NFR BLD AUTO: 27.3 %
MCH RBC QN AUTO: 30.3 PG (ref 27–31)
MCHC RBC AUTO-ENTMCNC: 33.6 G/DL (ref 33–36)
MCV RBC AUTO: 90.2 FL (ref 80–94)
MONOCYTES # BLD AUTO: 0.48 X10(3)/MCL (ref 0.1–1.3)
MONOCYTES NFR BLD AUTO: 7.3 %
NEUTROPHILS # BLD AUTO: 4.12 X10(3)/MCL (ref 2.1–9.2)
NEUTROPHILS NFR BLD AUTO: 62.8 %
NRBC BLD AUTO-RTO: 0 %
PLATELET # BLD AUTO: 236 X10(3)/MCL (ref 130–400)
PMV BLD AUTO: 10.1 FL (ref 7.4–10.4)
POTASSIUM SERPL-SCNC: 3.8 MMOL/L (ref 3.5–5.1)
PROT SERPL-MCNC: 6.8 GM/DL (ref 5.8–7.6)
RBC # BLD AUTO: 4.59 X10(6)/MCL (ref 4.2–5.4)
SODIUM SERPL-SCNC: 143 MMOL/L (ref 136–145)
WBC # BLD AUTO: 6.56 X10(3)/MCL (ref 4.5–11.5)

## 2025-05-23 PROCEDURE — 99213 OFFICE O/P EST LOW 20 MIN: CPT | Mod: PBBFAC | Performed by: NURSE PRACTITIONER

## 2025-05-23 PROCEDURE — 99999 PR PBB SHADOW E&M-EST. PATIENT-LVL III: CPT | Mod: PBBFAC,,, | Performed by: NURSE PRACTITIONER

## 2025-05-23 PROCEDURE — 85025 COMPLETE CBC W/AUTO DIFF WBC: CPT

## 2025-05-23 PROCEDURE — 36415 COLL VENOUS BLD VENIPUNCTURE: CPT

## 2025-05-23 PROCEDURE — 80053 COMPREHEN METABOLIC PANEL: CPT

## 2025-05-23 RX ORDER — LETROZOLE 2.5 MG/1
2.5 TABLET, FILM COATED ORAL DAILY
Qty: 30 TABLET | Refills: 3 | Status: SHIPPED | OUTPATIENT
Start: 2025-05-23

## 2025-05-23 RX ORDER — ALENDRONATE SODIUM 70 MG/1
70 TABLET ORAL
Qty: 4 TABLET | Refills: 11 | Status: SHIPPED | OUTPATIENT
Start: 2025-05-23

## 2025-05-23 RX ORDER — CHROMIUM PICOLINATE 200 MCG
1 TABLET ORAL ONCE
Qty: 30 CAPSULE | Refills: 3 | Status: SHIPPED | OUTPATIENT
Start: 2025-05-23 | End: 2025-05-23

## 2025-05-23 NOTE — PROGRESS NOTES
Subjective:       Patient ID: Karo Lakhani is a 84 y.o. female.    Chief Complaint: Follow up Malignant neoplasm of overlapping sites of left breast in f (No complaints. )       Diagnosis: Left Breast Cancer - Hormone Positive    Current Treatment:   Letrozole 3/1/2024-present  Unable to obtain dental clearance for prolia. Fosamax started 7/11/2024-present     Treatment History:   Left Breast Lumpectomy with SLNBx - Dr. Escobar - 1/12/24    HPI:  82yo F presented in February '24 for evaluation of hormone positive left breast cancer. She felt a mass in her left breast in Fall '23 and underwent diagnostic imagine in November '23 which revealed at the 12 o'clock position 4 CMFN, an irregular hypoechoic mass with associated calcifications, spanning approximately 4.7 x 1.7 x 1.8 cm. There was also noted to be a linear hypoechoic component extending toward the nipple as well as a presumed satellite nodule measuring 1.0 x 0.7 by 0.8 cm at the 11 o'clock  2 CMFN.  No pathologically enlarged lymph nodes visualized in the left axilla. Biopsy of the mass revealed Invasive ductal Carcinoma, G2, ER 95%, AZ 94%, Her2 2+, Fish Negative, Ki67 27%. She underwent left breast lumpectomy with Dr. Escobar on 1/12/24 with final pathology revealing a 3.8cm G2 IDC with approx 4cm of G2 DCIS as well. Of the 6 lymph nodes sampled, 1 returned positive for micrometastatic disease measuring 0.4mm. She had an oncotype done with a score of 15. Chemotherapy was not recommended given oncotype score. She was evaluated by radiation oncology who did not recommend adjuvant radiation. Plan is for adjuvant antihormonal therapy for at least 5 years.     Interval History:  She returns to the clinic today for a three month follow-up. She continues her letrozole without any side effects. She continues on fosamax weekly. Mammogram completed 11/30/2024 showed ADAM. She denies any breast complaints today. She denies any SOB, CP, headache, swelling, mood swings,  vaginal dryness, joint pain, or hot flashes.      Past Medical History:   Diagnosis Date    Cancer 10/23      Past Surgical History:   Procedure Laterality Date    AXILLARY NODE DISSECTION Left 01/12/2024    Procedure: LYMPHADENECTOMY, AXILLARY - LEFT;  Surgeon: Dayami Escobar MD;  Location: [facility];  Service: General;  Laterality: Left;    BREAST SURGERY  01/12/2024    COLONOSCOPY      FRACTURE SURGERY  10/2017    Hip Surgery    HIP FRACTURE SURGERY Left 10/2017    JOINT REPLACEMENT      SENTINEL LYMPH NODE BIOPSY Left 01/12/2024    Procedure: BIOPSY, LYMPH NODE, SENTINEL - LEFT MagTrace Injected in office SentiMag needed No NUC MED;  Surgeon: Dayami Escobar MD;  Location: [facility] OR;  Service: General;  Laterality: Left;  MagTrace Injected in office  SentiMag needed  No NUC MED    SIMPLE MASTECTOMY Left 01/12/2024    Procedure: MASTECTOMY, SIMPLE - LEFT;  Surgeon: Dayami Escobar MD;  Location: [facility];  Service: General;  Laterality: Left;     Social History     Socioeconomic History    Marital status: Single   Tobacco Use    Smoking status: Never     Passive exposure: Never    Smokeless tobacco: Never   Substance and Sexual Activity    Alcohol use: Never    Drug use: Never    Sexual activity: Never      Family History   Problem Relation Name Age of Onset    Dementia Mother Dinah Lakhani 80 - 89    Heart disease Mother Dinah Lakhani     Arthritis Mother Dinah Lakhani     Colon cancer Father Dudley Lakhani 91    Cancer Father Dudley Lakhani         Colon cancer    Throat cancer Maternal Grandmother      Skin cancer Maternal Grandmother      Cancer Sister Joy Greene         Liver cancer      Review of patient's allergies indicates:  No Known Allergies   Review of Systems   Constitutional:  Negative for activity change, appetite change, fever and unexpected weight change.   HENT:  Negative for mouth sores and sore throat.    Eyes:  Negative for visual disturbance.   Respiratory:  Negative for cough and  shortness of breath.    Cardiovascular:  Negative for chest pain.   Gastrointestinal:  Negative for abdominal pain, diarrhea, nausea and vomiting.   Endocrine: Negative for polyuria.   Genitourinary:  Negative for dysuria, frequency and hot flashes.   Musculoskeletal:  Negative for back pain.        Right wrist nodule with pain    Integumentary:  Negative for rash.   Allergic/Immunologic: Negative for immunocompromised state.   Neurological:  Negative for weakness and headaches.   Hematological:  Negative for adenopathy.   Psychiatric/Behavioral:  Negative for confusion. The patient is not nervous/anxious.          Objective:      Vitals:    05/23/25 1004   BP: 127/75   Pulse: 66   Resp: 16   Temp: 98.1 °F (36.7 °C)             Physical Exam  Constitutional:       General: She is not in acute distress.     Appearance: Normal appearance. She is not ill-appearing.   HENT:      Head: Normocephalic and atraumatic.      Nose: Nose normal.      Mouth/Throat:      Mouth: Mucous membranes are moist.      Pharynx: Oropharynx is clear.   Eyes:      Extraocular Movements: Extraocular movements intact.      Conjunctiva/sclera: Conjunctivae normal.      Pupils: Pupils are equal, round, and reactive to light.   Cardiovascular:      Rate and Rhythm: Normal rate and regular rhythm.      Pulses: Normal pulses.      Heart sounds: Normal heart sounds. No murmur heard.  Pulmonary:      Effort: Pulmonary effort is normal. No respiratory distress.      Breath sounds: Normal breath sounds.   Chest:      Comments: Well healed left breast mastectomy with scar tissue noted to outer area of incision.  No adenopathy, masses, rashes, or drainage noted bilaterally.   Abdominal:      General: There is no distension.      Palpations: Abdomen is soft.      Tenderness: There is no abdominal tenderness.   Musculoskeletal:         General: Normal range of motion.      Cervical back: Normal range of motion and neck supple.      Right lower leg: No  edema.      Left lower leg: No edema.   Lymphadenopathy:      Cervical: No cervical adenopathy.      Upper Body:      Right upper body: No supraclavicular or axillary adenopathy.      Left upper body: No supraclavicular or axillary adenopathy.   Skin:     General: Skin is warm and dry.   Neurological:      General: No focal deficit present.      Mental Status: She is alert and oriented to person, place, and time.         LABS AND IMAGING REVIEWED IN EPIC  11/16/2023 BL DG MG/ L US BREAST LIMITED at [facility]- which revealed on L MG in the area of palpable concern at the 12 o'clock anterior to middle depth, there is a spiculated mass with associated heterogeneous calcifications which spans on the order of 5 cm mammographically and extends towards the nipple which appears mildly retracted. On R MG, there are no suspicious findings. On L US at 12 o'clock 4 cm FN,  there is an irregular hypoechoic mass with associated calcifications, spanning approximately 4.7 x 1.7 x 1.8 cm.  This demonstrates linear hypoechoic component extending toward the nipple as well as a presumed satellite nodule measuring 1.0 x 0.7 by 0.8 cm at the 11 o'clock  2 cm FN.  No pathologically enlarged lymph nodes visualized in the left axilla.  BIRADS-5 highly suspicious; biopsy recommended.  2/29/24 DEXA:  1. Osteoporosis of the lumbar spine  2. Osteopenia of the distal left radius  3. Osteoporosis of the right hip  11/30/2024 R SRC MMG:  There is no mammographic evidence of malignancy.     Pathology:    12/6/23 Left Breast Biopsy:  FINAL DIAGNOSIS    LEFT BREAST BIOPSY 12 O'CLOCK 4 CMFN:    INVASIVE DUCTAL CARCINOMA, NOS TYPE, AT LEAST 1.5 CM GREATEST DIMENSION.    FOCAL DUCTAL CARCINOMA IN SITU, COMEDO TYPE, NUCLEAR GRADE 2, 1 MM GREATEST   EXTENT.    ADAM HISTOLOGIC SCORE:   -- TUBULE FORMATION: SCORE 2   -- NUCLEAR PLEOMORPHISM: SCORE 2   -- MITOTIC ACTIVITY: SCORE 2   -- OVERALL SCORE: 6/9 (GRADE 2).   ER/OR+, HER2-2+, Ki-67 27%    1/12/24  Left Lumpectomy:  FINAL DIAGNOSIS    1. LEFT BREAST (SIMPLE MASTECTOMY):    A) INVASIVE DUCTAL CARCINOMA (3.8 CM), GRADE 2 (2+2+2) WITH FOCAL   MICROCALCIFICATIONS AND FOCAL ORGANIZING PREVIOUS BIOPSY SITE.    B) DUCTAL CARCINOMA IN SITU, NOT EXTENSIVE (APPROXIMATELY 4 CM), GRADE 2, WITH   SOLID TO SLIGHTLY CRIBRIFORM ARCHITECTURE, FOCAL COMEDO NECROSIS AND FOCAL   MICROCALCIFICATIONS.    C) MULTIFOCAL LYMPHATIC TUMOR INVASION IDENTIFIED.    D) RESECTION MARGIN CLEAR (INVASIVE AND IN SITU TUMOR CLOSEST TO POSTERIOR   MARGIN AT 2.1 CM).    E) FATTY BREAST TISSUE WITH FOCAL NONPROLIFERATIVE FIBROCYSTIC CHANGES WITH   FOCAL MICROCALCIFICATIONS.    F) ONE LYMPH NODE NEGATIVE FOR METASTATIC TUMOR (0/1).    2. LYMPH NODE, LEFT AXILLARY SENTINEL #1 (EXCISION):    ONE LYMPH NODE POSITIVE FOR MICROMETASTATIC TUMOR (1/1).    3. LYMPH NODE, LEFT AXILLARY SENTINEL #2 (EXCISION):    ONE LYMPH NODE NEGATIVE FOR METASTATIC TUMOR (0/1).    4. LYMPH NODE, LEFT AXILLARY SENTINEL #3 (EXCISION):    TWO LYMPH NODES NEGATIVE FOR METASTATIC TUMOR (0/2).    5. LYMPH NODE, LEFT AXILLARY SENTINEL #4 (EXCISION):    ONE LYMPH NODE NEGATIVE FOR METASTATIC TUMOR (0/1).   [id number]; oncotype- 15    Assessment:   Stage 1B Left Breast Cancer - ER+/AK+/Her2 2+ FISH negative - s/p mastectomy 1/24. T2N1mi. Oncotype 15. No role for chemotherapy. Deferred axillary XRT due to age and micromet disease. Plan for AI x 5 years.   Letrozole started 3/1/2024. Doing well, continue at this time.  Dexa 2/24   Osteoporosis noted, unable to get dental clearance to begin Prolia due to cost. Fosamax started.   repeat due 2/2026  Continue calcium and vitamin D supplement daily, weight bearing exercise was encouraged.         Plan:   Dx: Left Breast Cancer  Status: Post-mastectomy, currently letrozole  On letrozole 2.5 mg daily plan to continue for 5-years.   Will refill medications as needed. Recent labs WNL.   Screening mammogram of right breast due 11/2025.   Will  continue follow-up every 3 months with labs.   Encouraged to continue self-breast exams of right breast. Performed clinical breast exam today.     Dx: Osteopenia/Osteoporosis  Status: Active, being treated  Previous DEXA scan showed osteopenia with area of osteoporosis in right hip.   Continuing alendronate (Fosamax) weekly, vitamin D and calcium supplements.   Next bone scan due 02/26    Patient instructions and visit orders.       -Follow-up:  F/U with 3 mths with NP  -Labs:  CBC, CMP  -Imaging:  Right screening mammogram 11/25  Dexa bone scan 02/26 will order at later appt  -Other orders:      30 were spent on this encounter    Mynor Acosta, MSN, FNP-BC, APRN, AOCNP   Department of Hematology/Oncology.

## 2025-08-22 ENCOUNTER — LAB VISIT (OUTPATIENT)
Dept: LAB | Facility: HOSPITAL | Age: 85
End: 2025-08-22
Payer: MEDICARE

## 2025-08-22 ENCOUNTER — OFFICE VISIT (OUTPATIENT)
Facility: CLINIC | Age: 85
End: 2025-08-22
Payer: MEDICARE

## 2025-08-22 VITALS
BODY MASS INDEX: 20.14 KG/M2 | OXYGEN SATURATION: 98 % | HEIGHT: 64 IN | DIASTOLIC BLOOD PRESSURE: 66 MMHG | SYSTOLIC BLOOD PRESSURE: 132 MMHG | TEMPERATURE: 98 F | HEART RATE: 56 BPM | WEIGHT: 118 LBS | RESPIRATION RATE: 18 BRPM

## 2025-08-22 DIAGNOSIS — M81.0 OSTEOPOROSIS, UNSPECIFIED OSTEOPOROSIS TYPE, UNSPECIFIED PATHOLOGICAL FRACTURE PRESENCE: ICD-10-CM

## 2025-08-22 DIAGNOSIS — Z98.890 STATUS POST LYMPH NODE BIOPSY: ICD-10-CM

## 2025-08-22 DIAGNOSIS — Z90.12 STATUS POST LEFT MASTECTOMY: ICD-10-CM

## 2025-08-22 DIAGNOSIS — Z79.811 USE OF LETROZOLE (FEMARA): ICD-10-CM

## 2025-08-22 DIAGNOSIS — C50.812 MALIGNANT NEOPLASM OF OVERLAPPING SITES OF LEFT BREAST IN FEMALE, ESTROGEN RECEPTOR POSITIVE: ICD-10-CM

## 2025-08-22 DIAGNOSIS — Z17.0 MALIGNANT NEOPLASM OF OVERLAPPING SITES OF LEFT BREAST IN FEMALE, ESTROGEN RECEPTOR POSITIVE: ICD-10-CM

## 2025-08-22 DIAGNOSIS — M81.0 OSTEOPOROSIS WITHOUT CURRENT PATHOLOGICAL FRACTURE, UNSPECIFIED OSTEOPOROSIS TYPE: ICD-10-CM

## 2025-08-22 DIAGNOSIS — Z78.0 POST-MENOPAUSAL: Primary | ICD-10-CM

## 2025-08-22 DIAGNOSIS — Z79.811 LONG TERM (CURRENT) USE OF AROMATASE INHIBITORS: ICD-10-CM

## 2025-08-22 DIAGNOSIS — M81.0 AGE-RELATED OSTEOPOROSIS WITHOUT CURRENT PATHOLOGICAL FRACTURE: ICD-10-CM

## 2025-08-22 DIAGNOSIS — Z79.811 LONG TERM CURRENT USE OF AROMATASE INHIBITOR: ICD-10-CM

## 2025-08-22 LAB
ALBUMIN SERPL-MCNC: 3.5 G/DL (ref 3.4–4.8)
ALBUMIN/GLOB SERPL: 1.3 RATIO (ref 1.1–2)
ALP SERPL-CCNC: 57 UNIT/L (ref 40–150)
ALT SERPL-CCNC: 12 UNIT/L (ref 0–55)
ANION GAP SERPL CALC-SCNC: 8 MEQ/L
AST SERPL-CCNC: 20 UNIT/L (ref 11–45)
BASOPHILS # BLD AUTO: 0.05 X10(3)/MCL
BASOPHILS NFR BLD AUTO: 0.9 %
BILIRUB SERPL-MCNC: 0.5 MG/DL
BUN SERPL-MCNC: 17 MG/DL (ref 9.8–20.1)
CALCIUM SERPL-MCNC: 9.6 MG/DL (ref 8.4–10.2)
CHLORIDE SERPL-SCNC: 106 MMOL/L (ref 98–107)
CO2 SERPL-SCNC: 29 MMOL/L (ref 23–31)
CREAT SERPL-MCNC: 0.91 MG/DL (ref 0.55–1.02)
CREAT/UREA NIT SERPL: 19
EOSINOPHIL # BLD AUTO: 0.14 X10(3)/MCL (ref 0–0.9)
EOSINOPHIL NFR BLD AUTO: 2.5 %
ERYTHROCYTE [DISTWIDTH] IN BLOOD BY AUTOMATED COUNT: 12.8 % (ref 11.5–17)
GFR SERPLBLD CREATININE-BSD FMLA CKD-EPI: >60 ML/MIN/1.73/M2
GLOBULIN SER-MCNC: 2.8 GM/DL (ref 2.4–3.5)
GLUCOSE SERPL-MCNC: 97 MG/DL (ref 82–115)
HCT VFR BLD AUTO: 41.5 % (ref 37–47)
HGB BLD-MCNC: 13.5 G/DL (ref 12–16)
IMM GRANULOCYTES # BLD AUTO: 0.01 X10(3)/MCL (ref 0–0.04)
IMM GRANULOCYTES NFR BLD AUTO: 0.2 %
LYMPHOCYTES # BLD AUTO: 1.86 X10(3)/MCL (ref 0.6–4.6)
LYMPHOCYTES NFR BLD AUTO: 32.6 %
MCH RBC QN AUTO: 30.5 PG (ref 27–31)
MCHC RBC AUTO-ENTMCNC: 32.5 G/DL (ref 33–36)
MCV RBC AUTO: 93.7 FL (ref 80–94)
MONOCYTES # BLD AUTO: 0.5 X10(3)/MCL (ref 0.1–1.3)
MONOCYTES NFR BLD AUTO: 8.8 %
NEUTROPHILS # BLD AUTO: 3.14 X10(3)/MCL (ref 2.1–9.2)
NEUTROPHILS NFR BLD AUTO: 55 %
NRBC BLD AUTO-RTO: 0 %
PLATELET # BLD AUTO: 224 X10(3)/MCL (ref 130–400)
PMV BLD AUTO: 10 FL (ref 7.4–10.4)
POTASSIUM SERPL-SCNC: 3.6 MMOL/L (ref 3.5–5.1)
PROT SERPL-MCNC: 6.3 GM/DL (ref 5.8–7.6)
RBC # BLD AUTO: 4.43 X10(6)/MCL (ref 4.2–5.4)
SODIUM SERPL-SCNC: 143 MMOL/L (ref 136–145)
WBC # BLD AUTO: 5.7 X10(3)/MCL (ref 4.5–11.5)

## 2025-08-22 PROCEDURE — 99213 OFFICE O/P EST LOW 20 MIN: CPT | Mod: PBBFAC

## 2025-08-22 PROCEDURE — 36415 COLL VENOUS BLD VENIPUNCTURE: CPT

## 2025-08-22 PROCEDURE — 85025 COMPLETE CBC W/AUTO DIFF WBC: CPT

## 2025-08-22 PROCEDURE — 99999 PR PBB SHADOW E&M-EST. PATIENT-LVL III: CPT | Mod: PBBFAC,,,

## 2025-08-22 PROCEDURE — 80053 COMPREHEN METABOLIC PANEL: CPT

## 2025-08-22 RX ORDER — LETROZOLE 2.5 MG/1
2.5 TABLET, FILM COATED ORAL DAILY
Qty: 30 TABLET | Refills: 3 | Status: SHIPPED | OUTPATIENT
Start: 2025-08-22

## (undated) DEVICE — PAD ABD 8X10 STERILE

## (undated) DEVICE — BLADE SURG STAINLESS STEEL #15

## (undated) DEVICE — STRIP MEDI WND CLSR 1/2X4IN

## (undated) DEVICE — GLOVE 6.0 PROTEXIS PI MICRO

## (undated) DEVICE — BANDAGE GAUZE COT STRL 4.5X4.1

## (undated) DEVICE — SOL IRRI STRL WATER 1000ML

## (undated) DEVICE — GOWN X-LG STERILE BACK

## (undated) DEVICE — STAPLER SKIN PROXIMATE WIDE

## (undated) DEVICE — DRAPE FLUID WARMER ORS 44X44IN

## (undated) DEVICE — SPONGE LAP STRL 18X18IN

## (undated) DEVICE — BLANKET SNUGGLE WARM LOWER BDY

## (undated) DEVICE — GLOVE SENSICARE PI MICRO 7.5

## (undated) DEVICE — SUT SILK 3-0 BLK BR SH 30IN

## (undated) DEVICE — SUT STRATAFIX 4-0 30CM PS-2

## (undated) DEVICE — NDL HYPO REG 25G X 1 1/2

## (undated) DEVICE — NDL SYR 10ML 18X1.5 LL BLUNT

## (undated) DEVICE — GLOVE SENSICARE PI MICRO 6

## (undated) DEVICE — FLEXFIT ULTRA-HIGH COVERAGE BRA

## (undated) DEVICE — SUT BLK MONO 3-0 CUT 18IN F

## (undated) DEVICE — SUPPORT ULNA NERVE PROTECTOR

## (undated) DEVICE — RESERVOIR JACKSON-PRATT 100CC

## (undated) DEVICE — GAUZE DRAIN N WVN 6PLY 4X4IN

## (undated) DEVICE — Device

## (undated) DEVICE — DRAIN SURG HUBLESS 30CM 15FR

## (undated) DEVICE — APPLICATOR CHLORAPREP ORN 26ML

## (undated) DEVICE — DRAPE FULL SHEET 70X100IN

## (undated) DEVICE — ILLUMINATOR PHOTONBLADE 8/11IN

## (undated) DEVICE — ADHESIVE DERMABOND ADVANCED

## (undated) DEVICE — SUT MCRYL PLUS 3-0 PS2 27IN

## (undated) DEVICE — APPLIER CLIP LIAGCLIP 9.375IN

## (undated) DEVICE — GAUZE SPONGE 4X4 12PLY

## (undated) DEVICE — COVER PROBE WITH BAND 6X96IN

## (undated) DEVICE — ELECTRODE PATIENT RETURN DISP